# Patient Record
Sex: MALE | Race: BLACK OR AFRICAN AMERICAN | NOT HISPANIC OR LATINO
[De-identification: names, ages, dates, MRNs, and addresses within clinical notes are randomized per-mention and may not be internally consistent; named-entity substitution may affect disease eponyms.]

---

## 2021-04-19 PROBLEM — Z00.00 ENCOUNTER FOR PREVENTIVE HEALTH EXAMINATION: Status: ACTIVE | Noted: 2021-04-19

## 2021-05-06 ENCOUNTER — APPOINTMENT (OUTPATIENT)
Dept: UROLOGY | Facility: CLINIC | Age: 86
End: 2021-05-06

## 2021-05-28 ENCOUNTER — APPOINTMENT (OUTPATIENT)
Dept: UROLOGY | Facility: CLINIC | Age: 86
End: 2021-05-28
Payer: COMMERCIAL

## 2021-05-28 VITALS
HEIGHT: 72 IN | TEMPERATURE: 98 F | SYSTOLIC BLOOD PRESSURE: 128 MMHG | DIASTOLIC BLOOD PRESSURE: 78 MMHG | HEART RATE: 93 BPM | WEIGHT: 168 LBS | BODY MASS INDEX: 22.75 KG/M2

## 2021-05-28 PROCEDURE — 99204 OFFICE O/P NEW MOD 45 MIN: CPT

## 2021-05-28 NOTE — ASSESSMENT
[FreeTextEntry1] : 86 year old male former smoker with gross hematuria \par Pt's wife reported 1 episode of gross hematuria (no clots) ~3 weeks ago \par +urinary frequency, dysuria, and intermittent dull R back pain \par PVR today to r/o retention: 100ml \par UA/UCx/Urine cytology \par CT urogram \par F/U for cystoscopy

## 2021-05-28 NOTE — HISTORY OF PRESENT ILLNESS
[FreeTextEntry1] : 86 year old male referred by Davina EUCEDA (family medicine)\par + urinary frequency, intermittent dysuria and Intermittent dull upper right back pain (5/10) for over a few months\par Pt's wife reports 1 episode of gross hematuria approximately 3 weeks ago (burgundy in color, no clots) \par Denies incontinence, fever/chills, N/V \par Good FOS\par Currently on Tamsulosin and Terazosin 10mg \par \par 4/19/21 UA - trace amount of blood and leukocytes. Treated with Macrobid x 7 days. \par Patient reports mild improvement in his urinary symptoms after abx. \par \par MedicalHx: HLD, Asthma?\par FamilyHx: No kidney, prostate, bladder cancer that he is aware of \par SocialHx: Former smoker (quit prior to 1994, <1pack/day). Occasional ETOH use

## 2021-06-01 LAB
APPEARANCE: ABNORMAL
BACTERIA UR CULT: NORMAL
BACTERIA: NEGATIVE
BILIRUBIN URINE: NEGATIVE
BLOOD URINE: ABNORMAL
CALCIUM OXALATE CRYSTALS: ABNORMAL
COLOR: YELLOW
GLUCOSE QUALITATIVE U: NORMAL
HYALINE CASTS: 0 /LPF
KETONES URINE: NEGATIVE
LEUKOCYTE ESTERASE URINE: ABNORMAL
MICROSCOPIC-UA: NORMAL
NITRITE URINE: NEGATIVE
PH URINE: 6
PROTEIN URINE: ABNORMAL
RED BLOOD CELLS URINE: 39 /HPF
SPECIFIC GRAVITY URINE: 1.02
SQUAMOUS EPITHELIAL CELLS: 1 /HPF
UROBILINOGEN URINE: NORMAL
WHITE BLOOD CELLS URINE: 28 /HPF

## 2021-06-10 ENCOUNTER — APPOINTMENT (OUTPATIENT)
Dept: UROLOGY | Facility: CLINIC | Age: 86
End: 2021-06-10
Payer: COMMERCIAL

## 2021-06-10 VITALS — HEART RATE: 94 BPM | DIASTOLIC BLOOD PRESSURE: 63 MMHG | SYSTOLIC BLOOD PRESSURE: 152 MMHG | TEMPERATURE: 98.2 F

## 2021-06-10 DIAGNOSIS — R31.0 GROSS HEMATURIA: ICD-10-CM

## 2021-06-10 LAB — URINE CYTOLOGY: NORMAL

## 2021-06-10 PROCEDURE — 52000 CYSTOURETHROSCOPY: CPT

## 2021-06-10 PROCEDURE — 99214 OFFICE O/P EST MOD 30 MIN: CPT | Mod: 25,57

## 2021-06-10 NOTE — ASSESSMENT
[FreeTextEntry1] : bladder cnacer\par await ct urogram\par role TURBT reviewed at length\par risks - infection hematuira dysruia bladder perf need for quintanilla reviewed\par he and his wife understand\par will schedule

## 2021-06-10 NOTE — HISTORY OF PRESENT ILLNESS
[FreeTextEntry1] : diffusely erytematous bladder and tumor noted throughout\par poor visualziation\par +urine cytology\par needs CT scan\par

## 2021-06-14 LAB
ANION GAP SERPL CALC-SCNC: 11 MMOL/L
APPEARANCE: ABNORMAL
APTT BLD: 30.1 SEC
BACTERIA UR CULT: NORMAL
BACTERIA: NEGATIVE
BASOPHILS # BLD AUTO: 0.03 K/UL
BASOPHILS NFR BLD AUTO: 0.4 %
BILIRUBIN URINE: ABNORMAL
BLOOD URINE: ABNORMAL
BUN SERPL-MCNC: 17 MG/DL
CALCIUM SERPL-MCNC: 9.3 MG/DL
CHLORIDE SERPL-SCNC: 106 MMOL/L
CO2 SERPL-SCNC: 26 MMOL/L
COLOR: ABNORMAL
CREAT SERPL-MCNC: 1.06 MG/DL
EOSINOPHIL # BLD AUTO: 0.39 K/UL
EOSINOPHIL NFR BLD AUTO: 5.1 %
GLUCOSE QUALITATIVE U: NEGATIVE
GLUCOSE SERPL-MCNC: 112 MG/DL
HCT VFR BLD CALC: 43.5 %
HGB BLD-MCNC: 14.4 G/DL
HYALINE CASTS: 2 /LPF
IMM GRANULOCYTES NFR BLD AUTO: 0.4 %
INR PPP: 1.07 RATIO
KETONES URINE: NEGATIVE
LEUKOCYTE ESTERASE URINE: ABNORMAL
LYMPHOCYTES # BLD AUTO: 1.76 K/UL
LYMPHOCYTES NFR BLD AUTO: 23.1 %
MAN DIFF?: NORMAL
MCHC RBC-ENTMCNC: 30.8 PG
MCHC RBC-ENTMCNC: 33.1 GM/DL
MCV RBC AUTO: 93.1 FL
MICROSCOPIC-UA: NORMAL
MONOCYTES # BLD AUTO: 0.57 K/UL
MONOCYTES NFR BLD AUTO: 7.5 %
NEUTROPHILS # BLD AUTO: 4.84 K/UL
NEUTROPHILS NFR BLD AUTO: 63.5 %
NITRITE URINE: POSITIVE
PH URINE: 6.5
PLATELET # BLD AUTO: 201 K/UL
POTASSIUM SERPL-SCNC: 4 MMOL/L
PROTEIN URINE: ABNORMAL
PT BLD: 12.7 SEC
RBC # BLD: 4.67 M/UL
RBC # FLD: 13.4 %
RED BLOOD CELLS URINE: >720 /HPF
SARS-COV-2 N GENE NPH QL NAA+PROBE: NOT DETECTED
SODIUM SERPL-SCNC: 142 MMOL/L
SPECIFIC GRAVITY URINE: 1.02
SQUAMOUS EPITHELIAL CELLS: 11 /HPF
UROBILINOGEN URINE: NORMAL
WBC # FLD AUTO: 7.62 K/UL
WHITE BLOOD CELLS URINE: 10 /HPF

## 2021-07-11 ENCOUNTER — TRANSCRIPTION ENCOUNTER (OUTPATIENT)
Age: 86
End: 2021-07-11

## 2021-07-12 ENCOUNTER — RESULT REVIEW (OUTPATIENT)
Age: 86
End: 2021-07-12

## 2021-07-12 ENCOUNTER — APPOINTMENT (OUTPATIENT)
Dept: UROLOGY | Facility: AMBULATORY SURGERY CENTER | Age: 86
End: 2021-07-12

## 2021-07-12 ENCOUNTER — OUTPATIENT (OUTPATIENT)
Dept: OUTPATIENT SERVICES | Facility: HOSPITAL | Age: 86
LOS: 1 days | Discharge: HOME | End: 2021-07-12
Payer: COMMERCIAL

## 2021-07-12 DIAGNOSIS — C67.9 MALIGNANT NEOPLASM OF BLADDER, UNSPECIFIED: ICD-10-CM

## 2021-07-12 PROCEDURE — 52240 CYSTOSCOPY AND TREATMENT: CPT

## 2021-07-12 PROCEDURE — 88305 TISSUE EXAM BY PATHOLOGIST: CPT | Mod: 26

## 2021-07-12 RX ORDER — PHENAZOPYRIDINE 200 MG/1
200 TABLET, FILM COATED ORAL 3 TIMES DAILY
Qty: 9 | Refills: 0 | Status: ACTIVE | COMMUNITY
Start: 2021-07-12 | End: 1900-01-01

## 2021-07-14 ENCOUNTER — APPOINTMENT (OUTPATIENT)
Dept: UROLOGY | Facility: CLINIC | Age: 86
End: 2021-07-14
Payer: COMMERCIAL

## 2021-07-14 VITALS — HEART RATE: 105 BPM | DIASTOLIC BLOOD PRESSURE: 69 MMHG | SYSTOLIC BLOOD PRESSURE: 151 MMHG | TEMPERATURE: 97.6 F

## 2021-07-14 DIAGNOSIS — N40.1 BENIGN PROSTATIC HYPERPLASIA WITH LOWER URINARY TRACT SYMPMS: ICD-10-CM

## 2021-07-14 LAB — SURGICAL PATHOLOGY STUDY: SIGNIFICANT CHANGE UP

## 2021-07-14 PROCEDURE — 99024 POSTOP FOLLOW-UP VISIT: CPT

## 2021-07-14 NOTE — PHYSICAL EXAM

## 2021-07-14 NOTE — ASSESSMENT
[FreeTextEntry1] : BPH with LUTS\par s/p transurethral resection of prostates 7/12/2021\par POD #3 quintanilla removal\par \par Fr quintanilla, dark straw colored urine\par Catheter removed without complications, patient tolerated well.\par Patient had bowel movement while trying to urinate. \par Instilled 200 ml sterile water\par TOV - voided  50 ml, straw light pink urine\par PVR  ml, r/o urinary retention\par Patient feels urge to defecate and feels full. \par \par Reviewed post op instructions, advised patient  to call office and got o ER if experiencing severe pain, unable to urinate, gross hematuria, fever and chills. \par Patient and wife verbalized understanding of instructions\par \par Follow up in one week.

## 2021-07-22 ENCOUNTER — APPOINTMENT (OUTPATIENT)
Dept: UROLOGY | Facility: CLINIC | Age: 86
End: 2021-07-22
Payer: COMMERCIAL

## 2021-07-22 VITALS — DIASTOLIC BLOOD PRESSURE: 77 MMHG | HEART RATE: 80 BPM | SYSTOLIC BLOOD PRESSURE: 131 MMHG | TEMPERATURE: 97.7 F

## 2021-07-22 PROBLEM — C67.9 BLADDER CANCER: Status: ACTIVE | Noted: 2021-06-10

## 2021-07-22 PROCEDURE — 99214 OFFICE O/P EST MOD 30 MIN: CPT

## 2021-07-22 RX ORDER — ALFUZOSIN HYDROCHLORIDE 10 MG/1
10 TABLET, EXTENDED RELEASE ORAL DAILY
Qty: 30 | Refills: 11 | Status: ACTIVE | COMMUNITY
Start: 2021-07-22 | End: 1900-01-01

## 2021-07-22 NOTE — ASSESSMENT
[FreeTextEntry1] : urothelial CIS\par role repeat biopsy reviewed\par role BCG reviewed\par will schedule for OR\par \par uirnary retention\par for voiding trial overnight\par due to start alfuzosin\par f/u tomorrow for PVR\par

## 2021-07-22 NOTE — HISTORY OF PRESENT ILLNESS
[FreeTextEntry1] : s/p TURBT +CIS\par post op retention seen at ER now with quintanilla in place\par here to discuss

## 2021-07-23 ENCOUNTER — APPOINTMENT (OUTPATIENT)
Dept: UROLOGY | Facility: CLINIC | Age: 86
End: 2021-07-23

## 2021-07-27 ENCOUNTER — APPOINTMENT (OUTPATIENT)
Dept: UROLOGY | Facility: CLINIC | Age: 86
End: 2021-07-27
Payer: COMMERCIAL

## 2021-07-27 VITALS — HEART RATE: 99 BPM | DIASTOLIC BLOOD PRESSURE: 72 MMHG | TEMPERATURE: 97.6 F | SYSTOLIC BLOOD PRESSURE: 145 MMHG

## 2021-07-27 PROCEDURE — 51798 US URINE CAPACITY MEASURE: CPT

## 2021-07-27 PROCEDURE — 99212 OFFICE O/P EST SF 10 MIN: CPT

## 2021-07-27 NOTE — HISTORY OF PRESENT ILLNESS
[FreeTextEntry1] : returns for eval\par voiding comfortably\par pvr to r/o retetention 1 cc\par no new compalints\par scheudled for TURBT

## 2021-07-28 LAB
ANION GAP SERPL CALC-SCNC: 14 MMOL/L
APPEARANCE: CLEAR
APTT BLD: 31.6 SEC
BACTERIA: ABNORMAL
BASOPHILS # BLD AUTO: 0.07 K/UL
BASOPHILS NFR BLD AUTO: 0.8 %
BILIRUBIN URINE: NEGATIVE
BLOOD URINE: ABNORMAL
BUN SERPL-MCNC: 14 MG/DL
CALCIUM SERPL-MCNC: 9.2 MG/DL
CHLORIDE SERPL-SCNC: 104 MMOL/L
CO2 SERPL-SCNC: 26 MMOL/L
COLOR: ABNORMAL
CREAT SERPL-MCNC: 1.11 MG/DL
EOSINOPHIL # BLD AUTO: 0.21 K/UL
EOSINOPHIL NFR BLD AUTO: 2.3 %
GLUCOSE QUALITATIVE U: NEGATIVE
GLUCOSE SERPL-MCNC: 80 MG/DL
HCT VFR BLD CALC: 44.5 %
HGB BLD-MCNC: 14.1 G/DL
HYALINE CASTS: 0 /LPF
IMM GRANULOCYTES NFR BLD AUTO: 0.3 %
INR PPP: 1.05 RATIO
KETONES URINE: NEGATIVE
LEUKOCYTE ESTERASE URINE: ABNORMAL
LYMPHOCYTES # BLD AUTO: 2.29 K/UL
LYMPHOCYTES NFR BLD AUTO: 25.3 %
MAN DIFF?: NORMAL
MCHC RBC-ENTMCNC: 30.1 PG
MCHC RBC-ENTMCNC: 31.7 GM/DL
MCV RBC AUTO: 95.1 FL
MICROSCOPIC-UA: NORMAL
MONOCYTES # BLD AUTO: 0.65 K/UL
MONOCYTES NFR BLD AUTO: 7.2 %
NEUTROPHILS # BLD AUTO: 5.79 K/UL
NEUTROPHILS NFR BLD AUTO: 64.1 %
NITRITE URINE: POSITIVE
PH URINE: 6.5
PLATELET # BLD AUTO: 260 K/UL
POTASSIUM SERPL-SCNC: 4.2 MMOL/L
PROTEIN URINE: ABNORMAL
PT BLD: 12.4 SEC
RBC # BLD: 4.68 M/UL
RBC # FLD: 13.5 %
RED BLOOD CELLS URINE: 70 /HPF
SODIUM SERPL-SCNC: 143 MMOL/L
SPECIFIC GRAVITY URINE: 1.02
SQUAMOUS EPITHELIAL CELLS: 2 /HPF
UROBILINOGEN URINE: NORMAL
WBC # FLD AUTO: 9.04 K/UL
WHITE BLOOD CELLS URINE: 10 /HPF

## 2021-08-02 LAB — BACTERIA UR CULT: NORMAL

## 2021-08-03 ENCOUNTER — APPOINTMENT (OUTPATIENT)
Dept: INTERNAL MEDICINE | Facility: CLINIC | Age: 86
End: 2021-08-03

## 2021-08-05 ENCOUNTER — APPOINTMENT (OUTPATIENT)
Dept: INTERNAL MEDICINE | Facility: CLINIC | Age: 86
End: 2021-08-05

## 2021-08-06 ENCOUNTER — INPATIENT (INPATIENT)
Facility: HOSPITAL | Age: 86
LOS: 6 days | Discharge: HOME | DRG: 872 | End: 2021-08-13
Attending: SURGERY | Admitting: SURGERY
Payer: MEDICARE

## 2021-08-06 VITALS
DIASTOLIC BLOOD PRESSURE: 76 MMHG | HEART RATE: 78 BPM | OXYGEN SATURATION: 95 % | SYSTOLIC BLOOD PRESSURE: 150 MMHG | RESPIRATION RATE: 18 BRPM

## 2021-08-06 LAB
ALBUMIN SERPL ELPH-MCNC: 3.8 G/DL — SIGNIFICANT CHANGE UP (ref 3.3–5)
ALP SERPL-CCNC: 68 U/L — SIGNIFICANT CHANGE UP (ref 40–120)
ALT FLD-CCNC: 8 U/L — LOW (ref 10–45)
ANION GAP SERPL CALC-SCNC: 13 MMOL/L — SIGNIFICANT CHANGE UP (ref 5–17)
APPEARANCE UR: CLEAR — SIGNIFICANT CHANGE UP
AST SERPL-CCNC: 12 U/L — SIGNIFICANT CHANGE UP (ref 10–40)
BACTERIA # UR AUTO: PRESENT /HPF
BILIRUB SERPL-MCNC: 0.8 MG/DL — SIGNIFICANT CHANGE UP (ref 0.2–1.2)
BILIRUB UR-MCNC: NEGATIVE — SIGNIFICANT CHANGE UP
BUN SERPL-MCNC: 19 MG/DL — SIGNIFICANT CHANGE UP (ref 7–23)
CALCIUM SERPL-MCNC: 9.2 MG/DL — SIGNIFICANT CHANGE UP (ref 8.4–10.5)
CHLORIDE SERPL-SCNC: 104 MMOL/L — SIGNIFICANT CHANGE UP (ref 96–108)
CO2 SERPL-SCNC: 26 MMOL/L — SIGNIFICANT CHANGE UP (ref 22–31)
COLOR SPEC: YELLOW — SIGNIFICANT CHANGE UP
COMMENT - URINE: SIGNIFICANT CHANGE UP
CREAT SERPL-MCNC: 1.09 MG/DL — SIGNIFICANT CHANGE UP (ref 0.5–1.3)
DIFF PNL FLD: ABNORMAL
EPI CELLS # UR: SIGNIFICANT CHANGE UP /HPF (ref 0–5)
GLUCOSE SERPL-MCNC: 127 MG/DL — HIGH (ref 70–99)
GLUCOSE UR QL: NEGATIVE — SIGNIFICANT CHANGE UP
HCT VFR BLD CALC: 40.5 % — SIGNIFICANT CHANGE UP (ref 39–50)
HGB BLD-MCNC: 13.3 G/DL — SIGNIFICANT CHANGE UP (ref 13–17)
KETONES UR-MCNC: NEGATIVE — SIGNIFICANT CHANGE UP
LACTATE SERPL-SCNC: 1.1 MMOL/L — SIGNIFICANT CHANGE UP (ref 0.5–2)
LEUKOCYTE ESTERASE UR-ACNC: ABNORMAL
MCHC RBC-ENTMCNC: 30 PG — SIGNIFICANT CHANGE UP (ref 27–34)
MCHC RBC-ENTMCNC: 32.8 GM/DL — SIGNIFICANT CHANGE UP (ref 32–36)
MCV RBC AUTO: 91.2 FL — SIGNIFICANT CHANGE UP (ref 80–100)
NITRITE UR-MCNC: NEGATIVE — SIGNIFICANT CHANGE UP
NRBC # BLD: 0 /100 WBCS — SIGNIFICANT CHANGE UP (ref 0–0)
PH UR: 6 — SIGNIFICANT CHANGE UP (ref 5–8)
PLATELET # BLD AUTO: 221 K/UL — SIGNIFICANT CHANGE UP (ref 150–400)
POTASSIUM SERPL-MCNC: 4.2 MMOL/L — SIGNIFICANT CHANGE UP (ref 3.5–5.3)
POTASSIUM SERPL-SCNC: 4.2 MMOL/L — SIGNIFICANT CHANGE UP (ref 3.5–5.3)
PROT SERPL-MCNC: 6.9 G/DL — SIGNIFICANT CHANGE UP (ref 6–8.3)
PROT UR-MCNC: 30 MG/DL
RAPID RVP RESULT: SIGNIFICANT CHANGE UP
RBC # BLD: 4.44 M/UL — SIGNIFICANT CHANGE UP (ref 4.2–5.8)
RBC # FLD: 13 % — SIGNIFICANT CHANGE UP (ref 10.3–14.5)
RBC CASTS # UR COMP ASSIST: ABNORMAL /HPF
SARS-COV-2 RNA SPEC QL NAA+PROBE: NEGATIVE — SIGNIFICANT CHANGE UP
SARS-COV-2 RNA SPEC QL NAA+PROBE: SIGNIFICANT CHANGE UP
SODIUM SERPL-SCNC: 143 MMOL/L — SIGNIFICANT CHANGE UP (ref 135–145)
SP GR SPEC: 1.01 — SIGNIFICANT CHANGE UP (ref 1–1.03)
UROBILINOGEN FLD QL: 0.2 E.U./DL — SIGNIFICANT CHANGE UP
WBC # BLD: 12.22 K/UL — HIGH (ref 3.8–10.5)
WBC # FLD AUTO: 12.22 K/UL — HIGH (ref 3.8–10.5)
WBC UR QL: > 10 /HPF

## 2021-08-06 PROCEDURE — G1004: CPT

## 2021-08-06 PROCEDURE — 99285 EMERGENCY DEPT VISIT HI MDM: CPT

## 2021-08-06 PROCEDURE — 74177 CT ABD & PELVIS W/CONTRAST: CPT | Mod: 26,ME

## 2021-08-06 RX ORDER — ONDANSETRON 8 MG/1
4 TABLET, FILM COATED ORAL EVERY 6 HOURS
Refills: 0 | Status: DISCONTINUED | OUTPATIENT
Start: 2021-08-06 | End: 2021-08-13

## 2021-08-06 RX ORDER — ACETAMINOPHEN 500 MG
650 TABLET ORAL ONCE
Refills: 0 | Status: COMPLETED | OUTPATIENT
Start: 2021-08-06 | End: 2021-08-06

## 2021-08-06 RX ORDER — SODIUM CHLORIDE 9 MG/ML
1000 INJECTION, SOLUTION INTRAVENOUS
Refills: 0 | Status: DISCONTINUED | OUTPATIENT
Start: 2021-08-06 | End: 2021-08-09

## 2021-08-06 RX ORDER — PIPERACILLIN AND TAZOBACTAM 4; .5 G/20ML; G/20ML
3.38 INJECTION, POWDER, LYOPHILIZED, FOR SOLUTION INTRAVENOUS EVERY 6 HOURS
Refills: 0 | Status: COMPLETED | OUTPATIENT
Start: 2021-08-06 | End: 2021-08-13

## 2021-08-06 RX ORDER — VANCOMYCIN HCL 1 G
1000 VIAL (EA) INTRAVENOUS ONCE
Refills: 0 | Status: COMPLETED | OUTPATIENT
Start: 2021-08-06 | End: 2021-08-06

## 2021-08-06 RX ORDER — SODIUM CHLORIDE 9 MG/ML
2250 INJECTION INTRAMUSCULAR; INTRAVENOUS; SUBCUTANEOUS ONCE
Refills: 0 | Status: COMPLETED | OUTPATIENT
Start: 2021-08-06 | End: 2021-08-06

## 2021-08-06 RX ORDER — PIPERACILLIN AND TAZOBACTAM 4; .5 G/20ML; G/20ML
3.38 INJECTION, POWDER, LYOPHILIZED, FOR SOLUTION INTRAVENOUS ONCE
Refills: 0 | Status: COMPLETED | OUTPATIENT
Start: 2021-08-06 | End: 2021-08-06

## 2021-08-06 RX ORDER — ACETAMINOPHEN 500 MG
650 TABLET ORAL EVERY 6 HOURS
Refills: 0 | Status: DISCONTINUED | OUTPATIENT
Start: 2021-08-06 | End: 2021-08-13

## 2021-08-06 RX ADMIN — Medication 250 MILLIGRAM(S): at 16:02

## 2021-08-06 RX ADMIN — PIPERACILLIN AND TAZOBACTAM 200 GRAM(S): 4; .5 INJECTION, POWDER, LYOPHILIZED, FOR SOLUTION INTRAVENOUS at 14:33

## 2021-08-06 RX ADMIN — Medication 650 MILLIGRAM(S): at 14:34

## 2021-08-06 RX ADMIN — SODIUM CHLORIDE 2250 MILLILITER(S): 9 INJECTION INTRAMUSCULAR; INTRAVENOUS; SUBCUTANEOUS at 14:34

## 2021-08-06 RX ADMIN — PIPERACILLIN AND TAZOBACTAM 3.38 GRAM(S): 4; .5 INJECTION, POWDER, LYOPHILIZED, FOR SOLUTION INTRAVENOUS at 15:28

## 2021-08-06 NOTE — ED PROVIDER NOTE - CLINICAL SUMMARY MEDICAL DECISION MAKING FREE TEXT BOX
Patient with recent bladder resection who presents to the ER Patient with recent bladder resection who presents to the ER with lower abdominal pain, tachycardia and fevers.     Given recent bladder cancer and resection, concerns for intra abdominal pathology or infection.  Patient underwent lab work and CT scan and was started on broad spectrum antibiotics via IV    On CT was found to have  Sigmoid diverticulitis with 3.4 cm air and fluid containing intraluminal abscess.     Surgery was consulted.   Will admit for management.

## 2021-08-06 NOTE — H&P ADULT - HISTORY OF PRESENT ILLNESS
86-year-old male, poor historian, with history of HTN and recent diagnosis of Urothelial carcinoma s/p TURBT 2 weeks ago who presents with to the ED generalized weakness, increased urinary frequency and dysuria and found to have sigmoid diverticulitis associated LLQ abdominal pain and fever; endorse poor po intake. No chills, N/V/D/C; Last BM this morning, soft, non bloody; no melena, hematochezia; No SOB or chest pain. Never had symptoms like this before. Last colposcopy 20 years ago; reports had diverticula, otherwise negative; no EGD. No personal or family history of carcinoma.     PMH: HTN Urotheilial carcinoma s/p TURBT   PSH: TURBT   Meds: "takes HTN med" thinks its lisinopril, unable to recall  allergies: hay fever

## 2021-08-06 NOTE — H&P ADULT - NSHPLABSRESULTS_GEN_ALL_CORE
LABS:                        13.3   12.22 )-----------( 221      ( 06 Aug 2021 14:08 )             40.5     -    143  |  104  |  19  ----------------------------<  127<H>  4.2   |  26  |  1.09    Ca    9.2      06 Aug 2021 14:08    TPro  6.9  /  Alb  3.8  /  TBili  0.8  /  DBili  x   /  AST  12  /  ALT  8<L>  /  AlkPhos  68  08-      Urinalysis Basic - ( 06 Aug 2021 20:12 )    Color: Yellow / Appearance: Clear / S.010 / pH: x  Gluc: x / Ketone: NEGATIVE  / Bili: Negative / Urobili: 0.2 E.U./dL   Blood: x / Protein: 30 mg/dL / Nitrite: NEGATIVE   Leuk Esterase: Small / RBC: Many /HPF / WBC > 10 /HPF   Sq Epi: x / Non Sq Epi: 0-5 /HPF / Bacteria: Present /HPF        RADIOLOGY & ADDITIONAL STUDIES:  CT Abdomen and Pelvis w/ IV Cont:   EXAM:  CT ABDOMEN AND PELVIS IC                          PROCEDURE DATE:  2021          INTERPRETATION:  CT SCAN OF ABDOMEN AND PELVIS    History: Abdominal pain and fever. Recent bladder cancer resection.    Technique: CT scan of abdomen andpelvis was performed from lung bases through symphysis pubis. Intravenous contrast was utilized. Axial, sagittal and coronal reformatted images were reviewed.    Comparison: None.    Findings:    Lower chest: Normal.    Liver:  Normal.    Gallbladder: No radiopaque stones gallbladder.    Spleen:  Subcentimeter hypodensity in the spleen, too small to characterize.    Pancreas:  Normal.    Adrenal glands:  Normal.    Kidneys: 3.4 cm cyst lower pole left kidney. Multiple subcentimeter hypodensities in the right kidney, too small characterize..    Adenopathy:  Mildly prominent right common iliac nodes, measuring 0.7 cm. Few small pericolic nodes (3:69).    Ascites: None.    Gastrointestinal tract: Study is limited without oral contrast. Within this limitation there is colonic diverticulosis. There is a short segment of proximal sigmoid colon demonstrating asymmetrical wall thickening and perisigmoidal fat stranding and peritoneal thickening. There is an associated 3.4 x 2 x 3.1 cm rim-enhancing air containing hypodensity (series 4, image 32). There is no free air or evidence of obstruction. Normal appendix.    Vessels: Moderate calcified plaque aorta.    Pelvic organs: Asymmetric right bladder wall thickening and perivesicular fat stranding with 2.3 cm posterior bladder diverticulum. Coarse calcifications within a nonenlarged prostate.    Soft tissues: Normal.    Bones: Moderate degenerative change of the spine.    Impression:  1.  Sigmoid diverticulitis with 3.4 cm associated abscess. There is no free air or evidence of obstruction.  2.  Asymmetric bladder wall thickening with perivesicular fat stranding, which may reflect patient's known bladder cancer. Few mildly prominent right pelvic lymph nodes, metastasis cannot be excluded.        --- End of Report ---          Thank you for the opportunity to participate in the care of this patient.    DEBRA KAUR MD; Resident Radiologist  This document has been electronically signed.  TAHIRA MICHELLE MD; Attending Radiologist  This document has been electronically signed. Aug  6 2021  5:39PM (21 @ 16:24)

## 2021-08-06 NOTE — CHART NOTE - NSCHARTNOTEFT_GEN_A_CORE
Chief Surgery Resident's Note:    c/s for acute diverticulitis w/ abscess     86M w/ hx of urothelial carcinoma s/p TURBT 2 weeks ago with Dr. Lopez presenting with symptoms of burning on urination. CT A/P found incidental sigmoid diverticulitis with ~3.4 cm paracolonic abscess. No pneumoperitoneum or obstruction. Vitals stable. LLQ tenderness on exam, no peritonitis. WBC 12.2.     A/P: admit to surgery, telemetry level of care. Serial abdominal exams,  bowel rest, IV abx. No indication for IR drainage at this time. Discussed with surgical attending on call.

## 2021-08-06 NOTE — ED ADULT NURSE NOTE - OBJECTIVE STATEMENT
pt is 86y male, here for generalized weakness x a few days, pt denies any pain, fevers, n,v,d or urinary sx, per wife pt had a procedure for bladder biopsy 2 week ago, pt is a&Ox3, ambulatory with steady gait, abd soft, non-distended, non-tender, NAD present

## 2021-08-06 NOTE — H&P ADULT - ASSESSMENT
86-year-old male, poor historian, with history of HTN and recent diagnosis of Urothelial carcinoma s/p TURBT 2 weeks ago who presents increased urinary frequency and dysuria and found to have Sigmoid diverticulitison CT with ~3.4 cm paracolonic abscess;( Hinchey class 1b) No pneumoperitoneum or obstruction. Also has fever 100.4, LLQ tenderness on exam, WBC 12.2;     plan:   Admit to telemetry  NPO/IVF/OOB/IS  Continue Zosyn   Bowel rest  Serial abd exams  pain control  Team 4 surgery will follow   plan discussed with chief and attending on call

## 2021-08-06 NOTE — ED ADULT TRIAGE NOTE - CHIEF COMPLAINT QUOTE
85 y/o male c/o weakness for two weeks s/p discharge from the hospital two weeks ago. Pt noted febrile 100.4 oral temp.

## 2021-08-06 NOTE — ED PROVIDER NOTE - OBJECTIVE STATEMENT
86 year old male patient with history of Bladder Cancer who presents to the ER with his wife with generalized weakness and increased urinary frequency and dysuria. Patient underwent bladder cancer resection two weeks ago under Dr. Lopez, Urology and was sent home on antibiotics for 10 days. Wife states patient has been weak with continued hematuria and decreased PO intake.     Denies chest pain, nausea, vomiting, shortness of breath or headache.   No recent sick contacts or travel  Patient is vaccinated with Pfizer.

## 2021-08-06 NOTE — H&P ADULT - NSHPPHYSICALEXAM_GEN_ALL_CORE
Spoke to Celia and nataly scheduled for pt tbs   GENERAL: NAD  HEENT: NCAT, MMM, Normal conjunctiva, PERRL  RESP: Nonlabored breathing, No respiratory distress  CARD: not tachycardic, Normal peripheral perfusion  GI: soft, moderately distended; LLQ focal Tenderness with rebound; no rebound tenderness   EXTREM: No edema, No gross deformity of extremities  SKIN: No rashes, no lesions, seborrheic keratosis over abdomen   NEURO: AAOx3, No focal motor or sensory deficits  PSYCH: Affect and characteristics of appearance, verbalizations, and behaviors are appropriate

## 2021-08-07 LAB
ANION GAP SERPL CALC-SCNC: 10 MMOL/L — SIGNIFICANT CHANGE UP (ref 5–17)
BUN SERPL-MCNC: 15 MG/DL — SIGNIFICANT CHANGE UP (ref 7–23)
CALCIUM SERPL-MCNC: 8.5 MG/DL — SIGNIFICANT CHANGE UP (ref 8.4–10.5)
CHLORIDE SERPL-SCNC: 104 MMOL/L — SIGNIFICANT CHANGE UP (ref 96–108)
CO2 SERPL-SCNC: 24 MMOL/L — SIGNIFICANT CHANGE UP (ref 22–31)
COVID-19 SPIKE DOMAIN AB INTERP: POSITIVE
COVID-19 SPIKE DOMAIN ANTIBODY RESULT: >250 U/ML — HIGH
CREAT SERPL-MCNC: 0.99 MG/DL — SIGNIFICANT CHANGE UP (ref 0.5–1.3)
GLUCOSE SERPL-MCNC: 125 MG/DL — HIGH (ref 70–99)
HCT VFR BLD CALC: 36.2 % — LOW (ref 39–50)
HGB BLD-MCNC: 11.9 G/DL — LOW (ref 13–17)
MAGNESIUM SERPL-MCNC: 2.2 MG/DL — SIGNIFICANT CHANGE UP (ref 1.6–2.6)
MCHC RBC-ENTMCNC: 30.3 PG — SIGNIFICANT CHANGE UP (ref 27–34)
MCHC RBC-ENTMCNC: 32.9 GM/DL — SIGNIFICANT CHANGE UP (ref 32–36)
MCV RBC AUTO: 92.1 FL — SIGNIFICANT CHANGE UP (ref 80–100)
NRBC # BLD: 0 /100 WBCS — SIGNIFICANT CHANGE UP (ref 0–0)
PHOSPHATE SERPL-MCNC: 2.9 MG/DL — SIGNIFICANT CHANGE UP (ref 2.5–4.5)
PLATELET # BLD AUTO: 212 K/UL — SIGNIFICANT CHANGE UP (ref 150–400)
POTASSIUM SERPL-MCNC: 3.6 MMOL/L — SIGNIFICANT CHANGE UP (ref 3.5–5.3)
POTASSIUM SERPL-SCNC: 3.6 MMOL/L — SIGNIFICANT CHANGE UP (ref 3.5–5.3)
RBC # BLD: 3.93 M/UL — LOW (ref 4.2–5.8)
RBC # FLD: 12.9 % — SIGNIFICANT CHANGE UP (ref 10.3–14.5)
SARS-COV-2 IGG+IGM SERPL QL IA: >250 U/ML — HIGH
SARS-COV-2 IGG+IGM SERPL QL IA: POSITIVE
SODIUM SERPL-SCNC: 138 MMOL/L — SIGNIFICANT CHANGE UP (ref 135–145)
WBC # BLD: 8.2 K/UL — SIGNIFICANT CHANGE UP (ref 3.8–10.5)
WBC # FLD AUTO: 8.2 K/UL — SIGNIFICANT CHANGE UP (ref 3.8–10.5)

## 2021-08-07 RX ORDER — LABETALOL HCL 100 MG
10 TABLET ORAL ONCE
Refills: 0 | Status: COMPLETED | OUTPATIENT
Start: 2021-08-07 | End: 2021-08-07

## 2021-08-07 RX ORDER — ENOXAPARIN SODIUM 100 MG/ML
40 INJECTION SUBCUTANEOUS AT BEDTIME
Refills: 0 | Status: DISCONTINUED | OUTPATIENT
Start: 2021-08-07 | End: 2021-08-13

## 2021-08-07 RX ORDER — POTASSIUM CHLORIDE 20 MEQ
10 PACKET (EA) ORAL
Refills: 0 | Status: DISCONTINUED | OUTPATIENT
Start: 2021-08-07 | End: 2021-08-07

## 2021-08-07 RX ORDER — POTASSIUM CHLORIDE 20 MEQ
10 PACKET (EA) ORAL
Refills: 0 | Status: COMPLETED | OUTPATIENT
Start: 2021-08-07 | End: 2021-08-08

## 2021-08-07 RX ADMIN — SODIUM CHLORIDE 110 MILLILITER(S): 9 INJECTION, SOLUTION INTRAVENOUS at 00:19

## 2021-08-07 RX ADMIN — Medication 100 MILLIEQUIVALENT(S): at 21:49

## 2021-08-07 RX ADMIN — PIPERACILLIN AND TAZOBACTAM 200 GRAM(S): 4; .5 INJECTION, POWDER, LYOPHILIZED, FOR SOLUTION INTRAVENOUS at 06:02

## 2021-08-07 RX ADMIN — PIPERACILLIN AND TAZOBACTAM 200 GRAM(S): 4; .5 INJECTION, POWDER, LYOPHILIZED, FOR SOLUTION INTRAVENOUS at 18:29

## 2021-08-07 RX ADMIN — Medication 10 MILLIGRAM(S): at 10:55

## 2021-08-07 RX ADMIN — SODIUM CHLORIDE 110 MILLILITER(S): 9 INJECTION, SOLUTION INTRAVENOUS at 10:55

## 2021-08-07 RX ADMIN — PIPERACILLIN AND TAZOBACTAM 200 GRAM(S): 4; .5 INJECTION, POWDER, LYOPHILIZED, FOR SOLUTION INTRAVENOUS at 12:00

## 2021-08-07 RX ADMIN — PIPERACILLIN AND TAZOBACTAM 200 GRAM(S): 4; .5 INJECTION, POWDER, LYOPHILIZED, FOR SOLUTION INTRAVENOUS at 00:19

## 2021-08-07 RX ADMIN — Medication 100 MILLIEQUIVALENT(S): at 22:43

## 2021-08-07 NOTE — PROGRESS NOTE ADULT - ASSESSMENT
86-year-old male, PMH HTN and Urotheilial carcinoma s/p TURBT 2 weeks ago, presenting w generalized weakness and increased urinary frequency/dysuria and found to have sigmoid diverticulitis associated LLQ abdominal pain and fever. Urine cultures no growth yet, pending final results. Nursing also reports urinary incontinency w bladder scan ~220cc. Has had several episodes of watery stools. Stool C diff requested.  -NPO  -IVF  -IVAB (Zosyn)  -SQH/SCD  -f/u final urine cultures  -f/u stool labs  -f/u urology consult

## 2021-08-07 NOTE — CHART NOTE - NSCHARTNOTEFT_GEN_A_CORE
86-year-old male, PMH HTN and Urotheilial carcinoma s/p TURBT 2 weeks ago, presenting w generalized weakness and increased urinary frequency/dysuria and found to have sigmoid diverticulitis associated LLQ abdominal pain and fever. Nursing also report 3x watery diarrhea.  Patient did not permit physical exam and does not cooperate for evaluation. Does not appear in distress or acutely ill. AVSS. Urine cultures no growth yet, pending final results. Stool C diff requested. Will return for physical exam.

## 2021-08-07 NOTE — CONSULT NOTE ADULT - SUBJECTIVE AND OBJECTIVE BOX
HPI:  86-year-old male, poor historian, with history of HTN and recent diagnosis of Urothelial carcinoma s/p TURBT 2 weeks ago who presents with to the ED generalized weakness, increased urinary frequency and dysuria and found to have sigmoid diverticulitis associated LLQ abdominal pain and fever; endorse poor po intake. No chills, N/V/D/C; Last BM this morning, soft, non bloody; no melena, hematochezia; No SOB or chest pain. Never had symptoms like this before. Last colposcopy 20 years ago; reports had diverticula, otherwise negative; no EGD. No personal or family history of carcinoma.     Consult note  Patient reports his urinary symptoms have improved and he has no issues urinating. He has followed up in the office for biopsy review. He denies fever, chills, n/v, SOB or CP    PMH: HTN Urothelial carcinoma s/p TURBT   PSH: TURBT   Meds: "takes HTN med" thinks its lisinopril, unable to recall  allergies: hay fever (06 Aug 2021 23:21)      Vital Signs Last 24 Hrs  T(C): 36.9 (07 Aug 2021 17:30), Max: 37.2 (06 Aug 2021 21:40)  T(F): 98.4 (07 Aug 2021 17:30), Max: 98.9 (06 Aug 2021 21:40)  HR: 92 (07 Aug 2021 18:12) (76 - 102)  BP: 168/74 (07 Aug 2021 18:05) (139/79 - 177/77)  BP(mean): 107 (07 Aug 2021 18:05) (95 - 111)  RR: 19 (07 Aug 2021 18:05) (17 - 19)  SpO2: 96% (07 Aug 2021 18:12) (95% - 99%)  I&O's Summary    06 Aug 2021 07:01  -  07 Aug 2021 07:00  --------------------------------------------------------  IN: 970 mL / OUT: 0 mL / NET: 970 mL    07 Aug 2021 07:01  -  07 Aug 2021 18:21  --------------------------------------------------------  IN: 1210 mL / OUT: 0 mL / NET: 1210 mL    PE:  Gen: NAD  Abd: soft, nt/nd   : urinating         LABS:                        11.9   8.20  )-----------( 212      ( 07 Aug 2021 07:29 )             36.2     08-07    138  |  104  |  15  ----------------------------<  125<H>  3.6   |  24  |  0.99    Ca    8.5      07 Aug 2021 07:29  Phos  2.9     08-07  Mg     2.2     08-07    TPro  6.9  /  Alb  3.8  /  TBili  0.8  /  DBili  x   /  AST  12  /  ALT  8<L>  /  AlkPhos  68  08-06    Cultures  Culture Results:   No growth to date (08-06 @ 21:14)    A/P 86-year-old male, poor historian, with history of HTN and recent diagnosis of Urothelial carcinoma s/p TURBT 2 weeks ago who presents with to the ED generalized weakness, increased urinary frequency and dysuria and found to have sigmoid diverticulitis on CT     -He is on Zoysn and Vancomycin. Urinary symptoms have improved  -CT scan reviewed   -Ucx no growth to date, follow up   -Follow up outpatient as scheduled     Case reviewed with  resident

## 2021-08-07 NOTE — PROGRESS NOTE ADULT - SUBJECTIVE AND OBJECTIVE BOX
SUBJECTIVE:  Flatus: [ ] YES [ ] NO             Bowel Movement: [ ] YES [ ] NO  Pain (0-10):            Pain Control Adequate: [ ] YES [ ] NO  Nausea: [ ] YES [ ] NO            Vomiting: [ ] YES [ ] NO  Diarrhea: [ ] YES [ ] NO         Constipation: [ ] YES [ ] NO     Chest Pain: [ ] YES [ ] NO    SOB:  [ ] YES [ ] NO    MEDICATIONS  (STANDING):  lactated ringers. 1000 milliLiter(s) (110 mL/Hr) IV Continuous <Continuous>  piperacillin/tazobactam IVPB.. 3.375 Gram(s) IV Intermittent every 6 hours  potassium chloride  10 mEq/100 mL IVPB 10 milliEquivalent(s) IV Intermittent every 1 hour    MEDICATIONS  (PRN):  acetaminophen   Tablet .. 650 milliGRAM(s) Oral every 6 hours PRN Mild Pain (1 - 3)  ondansetron Injectable 4 milliGRAM(s) IV Push every 6 hours PRN Nausea      Vital Signs Last 24 Hrs  T(C): 36.9 (07 Aug 2021 17:30), Max: 37.2 (06 Aug 2021 21:40)  T(F): 98.4 (07 Aug 2021 17:30), Max: 98.9 (06 Aug 2021 21:40)  HR: 76 (07 Aug 2021 12:09) (76 - 90)  BP: 144/66 (07 Aug 2021 12:09) (139/79 - 177/77)  BP(mean): 95 (07 Aug 2021 12:09) (95 - 111)  RR: 17 (07 Aug 2021 12:09) (17 - 19)  SpO2: 96% (07 Aug 2021 12:09) (95% - 99%)    Physical Exam:  General: NAD, resting comfortably in bed  Pulmonary: Nonlabored breathing, no respiratory distress  Cardiovascular: NSR  Abdominal: soft, NT/ND  Extremities: WWP, normal strength  Neuro: A/O x 3, CNs II-XII grossly intact, no focal deficits, normal motor/sensation  Pulses: palpable distal pulses    I&O's Summary    06 Aug 2021 07:01  -  07 Aug 2021 07:00  --------------------------------------------------------  IN: 970 mL / OUT: 0 mL / NET: 970 mL    07 Aug 2021 07:01  -  07 Aug 2021 18:11  --------------------------------------------------------  IN: 1210 mL / OUT: 0 mL / NET: 1210 mL        LABS:                        11.9   8.20  )-----------( 212      ( 07 Aug 2021 07:29 )             36.2     08-07    138  |  104  |  15  ----------------------------<  125<H>  3.6   |  24  |  0.99    Ca    8.5      07 Aug 2021 07:29  Phos  2.9     08-07  Mg     2.2     08-07    TPro  6.9  /  Alb  3.8  /  TBili  0.8  /  DBili  x   /  AST  12  /  ALT  8<L>  /  AlkPhos  68  08-06      Urinalysis Basic - ( 06 Aug 2021 20:12 )    Color: Yellow / Appearance: Clear / S.010 / pH: x  Gluc: x / Ketone: NEGATIVE  / Bili: Negative / Urobili: 0.2 E.U./dL   Blood: x / Protein: 30 mg/dL / Nitrite: NEGATIVE   Leuk Esterase: Small / RBC: Many /HPF / WBC > 10 /HPF   Sq Epi: x / Non Sq Epi: 0-5 /HPF / Bacteria: Present /HPF      CAPILLARY BLOOD GLUCOSE        LIVER FUNCTIONS - ( 06 Aug 2021 14:08 )  Alb: 3.8 g/dL / Pro: 6.9 g/dL / ALK PHOS: 68 U/L / ALT: 8 U/L / AST: 12 U/L / GGT: x             RADIOLOGY & ADDITIONAL STUDIES:   SUBJECTIVE: Resting comfortably in bed. More cooperative vs last encounter and answers to questions regarding his condition. Reports watery stools. Denies abdominal pain, fever/chills, dizziness, dysuria, SOB, chest pain, or pain in extremities.    MEDICATIONS  (STANDING):  lactated ringers. 1000 milliLiter(s) (110 mL/Hr) IV Continuous <Continuous>  piperacillin/tazobactam IVPB.. 3.375 Gram(s) IV Intermittent every 6 hours  potassium chloride  10 mEq/100 mL IVPB 10 milliEquivalent(s) IV Intermittent every 1 hour    MEDICATIONS  (PRN):  acetaminophen   Tablet .. 650 milliGRAM(s) Oral every 6 hours PRN Mild Pain (1 - 3)  ondansetron Injectable 4 milliGRAM(s) IV Push every 6 hours PRN Nausea      Vital Signs Last 24 Hrs  T(C): 36.9 (07 Aug 2021 17:30), Max: 37.2 (06 Aug 2021 21:40)  T(F): 98.4 (07 Aug 2021 17:30), Max: 98.9 (06 Aug 2021 21:40)  HR: 76 (07 Aug 2021 12:09) (76 - 90)  BP: 144/66 (07 Aug 2021 12:09) (139/79 - 177/77)  BP(mean): 95 (07 Aug 2021 12:09) (95 - 111)  RR: 17 (07 Aug 2021 12:09) (17 - 19)  SpO2: 96% (07 Aug 2021 12:09) (95% - 99%)    Physical Exam:  General: NAD, resting comfortably in bed  Pulmonary: Nonlabored breathing, no respiratory distress  Cardiovascular: NSR  Abdominal: soft, NT/ND, or guarding  Extremities: WWP, normal strength  Neuro: A/O x 3, CNs II-XII grossly intact, no focal deficits, normal motor/sensation  Pulses: palpable distal pulses    I&O's Summary    06 Aug 2021 07:01  -  07 Aug 2021 07:00  --------------------------------------------------------  IN: 970 mL / OUT: 0 mL / NET: 970 mL    07 Aug 2021 07:01  -  07 Aug 2021 18:11  --------------------------------------------------------  IN: 1210 mL / OUT: 0 mL / NET: 1210 mL        LABS:                        11.9   8.20  )-----------( 212      ( 07 Aug 2021 07:29 )             36.2     08-07    138  |  104  |  15  ----------------------------<  125<H>  3.6   |  24  |  0.99    Ca    8.5      07 Aug 2021 07:29  Phos  2.9     08-07  Mg     2.2     08-07    TPro  6.9  /  Alb  3.8  /  TBili  0.8  /  DBili  x   /  AST  12  /  ALT  8<L>  /  AlkPhos  68  08-06      Urinalysis Basic - ( 06 Aug 2021 20:12 )    Color: Yellow / Appearance: Clear / S.010 / pH: x  Gluc: x / Ketone: NEGATIVE  / Bili: Negative / Urobili: 0.2 E.U./dL   Blood: x / Protein: 30 mg/dL / Nitrite: NEGATIVE   Leuk Esterase: Small / RBC: Many /HPF / WBC > 10 /HPF   Sq Epi: x / Non Sq Epi: 0-5 /HPF / Bacteria: Present /HPF      CAPILLARY BLOOD GLUCOSE        LIVER FUNCTIONS - ( 06 Aug 2021 14:08 )  Alb: 3.8 g/dL / Pro: 6.9 g/dL / ALK PHOS: 68 U/L / ALT: 8 U/L / AST: 12 U/L / GGT: x             RADIOLOGY & ADDITIONAL STUDIES:

## 2021-08-08 LAB
ANION GAP SERPL CALC-SCNC: 11 MMOL/L — SIGNIFICANT CHANGE UP (ref 5–17)
BUN SERPL-MCNC: 12 MG/DL — SIGNIFICANT CHANGE UP (ref 7–23)
C DIFF BY PCR RESULT: POSITIVE
C DIFF GDH STL QL: SIGNIFICANT CHANGE UP
C DIFF GDH STL QL: SIGNIFICANT CHANGE UP
C DIFF TOX GENS STL QL NAA+PROBE: SIGNIFICANT CHANGE UP
CALCIUM SERPL-MCNC: 8.6 MG/DL — SIGNIFICANT CHANGE UP (ref 8.4–10.5)
CHLORIDE SERPL-SCNC: 103 MMOL/L — SIGNIFICANT CHANGE UP (ref 96–108)
CO2 SERPL-SCNC: 23 MMOL/L — SIGNIFICANT CHANGE UP (ref 22–31)
CREAT SERPL-MCNC: 0.91 MG/DL — SIGNIFICANT CHANGE UP (ref 0.5–1.3)
CULTURE RESULTS: NO GROWTH — SIGNIFICANT CHANGE UP
GLUCOSE SERPL-MCNC: 120 MG/DL — HIGH (ref 70–99)
HCT VFR BLD CALC: 39.1 % — SIGNIFICANT CHANGE UP (ref 39–50)
HGB BLD-MCNC: 12.8 G/DL — LOW (ref 13–17)
MAGNESIUM SERPL-MCNC: 2.1 MG/DL — SIGNIFICANT CHANGE UP (ref 1.6–2.6)
MCHC RBC-ENTMCNC: 30 PG — SIGNIFICANT CHANGE UP (ref 27–34)
MCHC RBC-ENTMCNC: 32.7 GM/DL — SIGNIFICANT CHANGE UP (ref 32–36)
MCV RBC AUTO: 91.6 FL — SIGNIFICANT CHANGE UP (ref 80–100)
NRBC # BLD: 0 /100 WBCS — SIGNIFICANT CHANGE UP (ref 0–0)
PHOSPHATE SERPL-MCNC: 1.8 MG/DL — LOW (ref 2.5–4.5)
PLATELET # BLD AUTO: 225 K/UL — SIGNIFICANT CHANGE UP (ref 150–400)
POTASSIUM SERPL-MCNC: 3.2 MMOL/L — LOW (ref 3.5–5.3)
POTASSIUM SERPL-SCNC: 3.2 MMOL/L — LOW (ref 3.5–5.3)
RBC # BLD: 4.27 M/UL — SIGNIFICANT CHANGE UP (ref 4.2–5.8)
RBC # FLD: 12.8 % — SIGNIFICANT CHANGE UP (ref 10.3–14.5)
SODIUM SERPL-SCNC: 137 MMOL/L — SIGNIFICANT CHANGE UP (ref 135–145)
SPECIMEN SOURCE: SIGNIFICANT CHANGE UP
WBC # BLD: 9.21 K/UL — SIGNIFICANT CHANGE UP (ref 3.8–10.5)
WBC # FLD AUTO: 9.21 K/UL — SIGNIFICANT CHANGE UP (ref 3.8–10.5)

## 2021-08-08 PROCEDURE — 99223 1ST HOSP IP/OBS HIGH 75: CPT

## 2021-08-08 PROCEDURE — 99231 SBSQ HOSP IP/OBS SF/LOW 25: CPT

## 2021-08-08 RX ORDER — VANCOMYCIN HCL 1 G
500 VIAL (EA) INTRAVENOUS EVERY 6 HOURS
Refills: 0 | Status: DISCONTINUED | OUTPATIENT
Start: 2021-08-08 | End: 2021-08-09

## 2021-08-08 RX ORDER — LABETALOL HCL 100 MG
10 TABLET ORAL ONCE
Refills: 0 | Status: COMPLETED | OUTPATIENT
Start: 2021-08-08 | End: 2021-08-08

## 2021-08-08 RX ORDER — HYDROMORPHONE HYDROCHLORIDE 2 MG/ML
0.25 INJECTION INTRAMUSCULAR; INTRAVENOUS; SUBCUTANEOUS ONCE
Refills: 0 | Status: DISCONTINUED | OUTPATIENT
Start: 2021-08-08 | End: 2021-08-08

## 2021-08-08 RX ORDER — HYDROMORPHONE HYDROCHLORIDE 2 MG/ML
0.5 INJECTION INTRAMUSCULAR; INTRAVENOUS; SUBCUTANEOUS ONCE
Refills: 0 | Status: DISCONTINUED | OUTPATIENT
Start: 2021-08-08 | End: 2021-08-08

## 2021-08-08 RX ORDER — ACETAMINOPHEN 500 MG
1000 TABLET ORAL ONCE
Refills: 0 | Status: COMPLETED | OUTPATIENT
Start: 2021-08-08 | End: 2021-08-08

## 2021-08-08 RX ORDER — HYDRALAZINE HCL 50 MG
5 TABLET ORAL ONCE
Refills: 0 | Status: COMPLETED | OUTPATIENT
Start: 2021-08-08 | End: 2021-08-08

## 2021-08-08 RX ORDER — POTASSIUM CHLORIDE 20 MEQ
20 PACKET (EA) ORAL
Refills: 0 | Status: DISCONTINUED | OUTPATIENT
Start: 2021-08-08 | End: 2021-08-08

## 2021-08-08 RX ORDER — ACETAMINOPHEN 500 MG
1000 TABLET ORAL ONCE
Refills: 0 | Status: COMPLETED | OUTPATIENT
Start: 2021-08-09 | End: 2021-08-09

## 2021-08-08 RX ADMIN — SODIUM CHLORIDE 110 MILLILITER(S): 9 INJECTION, SOLUTION INTRAVENOUS at 06:00

## 2021-08-08 RX ADMIN — Medication 500 MILLIGRAM(S): at 03:35

## 2021-08-08 RX ADMIN — Medication 500 MILLIGRAM(S): at 17:29

## 2021-08-08 RX ADMIN — Medication 1000 MILLIGRAM(S): at 09:30

## 2021-08-08 RX ADMIN — PIPERACILLIN AND TAZOBACTAM 200 GRAM(S): 4; .5 INJECTION, POWDER, LYOPHILIZED, FOR SOLUTION INTRAVENOUS at 17:30

## 2021-08-08 RX ADMIN — Medication 5 MILLIGRAM(S): at 18:19

## 2021-08-08 RX ADMIN — Medication 500 MILLIGRAM(S): at 12:18

## 2021-08-08 RX ADMIN — HYDROMORPHONE HYDROCHLORIDE 0.5 MILLIGRAM(S): 2 INJECTION INTRAMUSCULAR; INTRAVENOUS; SUBCUTANEOUS at 23:00

## 2021-08-08 RX ADMIN — ENOXAPARIN SODIUM 40 MILLIGRAM(S): 100 INJECTION SUBCUTANEOUS at 00:44

## 2021-08-08 RX ADMIN — PIPERACILLIN AND TAZOBACTAM 200 GRAM(S): 4; .5 INJECTION, POWDER, LYOPHILIZED, FOR SOLUTION INTRAVENOUS at 05:30

## 2021-08-08 RX ADMIN — Medication 100 MILLIEQUIVALENT(S): at 00:44

## 2021-08-08 RX ADMIN — PIPERACILLIN AND TAZOBACTAM 200 GRAM(S): 4; .5 INJECTION, POWDER, LYOPHILIZED, FOR SOLUTION INTRAVENOUS at 00:01

## 2021-08-08 RX ADMIN — Medication 1000 MILLIGRAM(S): at 21:41

## 2021-08-08 RX ADMIN — PIPERACILLIN AND TAZOBACTAM 200 GRAM(S): 4; .5 INJECTION, POWDER, LYOPHILIZED, FOR SOLUTION INTRAVENOUS at 12:16

## 2021-08-08 RX ADMIN — Medication 400 MILLIGRAM(S): at 20:41

## 2021-08-08 RX ADMIN — ENOXAPARIN SODIUM 40 MILLIGRAM(S): 100 INJECTION SUBCUTANEOUS at 21:59

## 2021-08-08 RX ADMIN — Medication 400 MILLIGRAM(S): at 09:02

## 2021-08-08 RX ADMIN — HYDROMORPHONE HYDROCHLORIDE 0.5 MILLIGRAM(S): 2 INJECTION INTRAMUSCULAR; INTRAVENOUS; SUBCUTANEOUS at 22:38

## 2021-08-08 RX ADMIN — Medication 10 MILLIGRAM(S): at 15:57

## 2021-08-08 NOTE — PROGRESS NOTE ADULT - SUBJECTIVE AND OBJECTIVE BOX
Patient seen and examined at bedside.  Denied significant abdominal pain.    C. diff was positive, although toxin was negative.    Abdomen soft, NT ND.

## 2021-08-08 NOTE — CONSULT NOTE ADULT - ASSESSMENT
A/P: 86M w/urothelial carcinoma s/p TURBT 2 weeks ago, HTN adm on 8/6 w/sepsis 2/2 sigmoid diverticulitis. Plan for possible 2nd look TURBT while inpatient.    #Preop risk stratification - RCRI - 0 w/METS > 4, indicating the patient is low risk for a low to intermediate risk procedure.  - no further testing indicated    #Sepsis 2/2 sigmoid diverticulitis - possibly 2/2 C. diff? The pt was found to have (+) C. diff antigen but negative C.diff toxin by PCR.   - f/u testing to detect toxin gene sequences associated w/toxin producing C. diff  - agree w/ continuing with PO vancomycin for C. diff treatment    #UTI - patient found to be retaining with (+) UA  - f/u Urine culture results  - can c/w Zosyn for now; would deescalate based on culture data    #HTN - patient intermittently hypertensive throughout day; suspect component of agitation  - redirect if possible; treat agitation  - can start amlodipine 5  - PRN IV labetalol 10mg for SBP > 200 as long as HR > 60. If not, can use Hydralazine 10mg IVP    #Urothelial carcinoma - management as per urology A/P: 86M w/urothelial carcinoma s/p TURBT 2 weeks ago, HTN adm on 8/6 w/sepsis 2/2 sigmoid diverticulitis. Plan for possible 2nd look TURBT while inpatient.    #Preop risk stratification - RCRI - 0 w/METS > 4, indicating the patient is low risk for a low to intermediate risk procedure.  - no further testing indicated  - patient is medically optimized pending adequate control of his sepsis    #Sepsis 2/2 sigmoid diverticulitis - possibly 2/2 C. diff? The pt was found to have (+) C. diff antigen but negative C.diff toxin by PCR.   - f/u testing to detect toxin gene sequences associated w/toxin producing C. diff  - agree w/ continuing with PO vancomycin for C. diff treatment    #UTI - patient found to be retaining with (+) UA  - f/u Urine culture results  - can c/w Zosyn for now; would deescalate based on culture data    #HTN - patient intermittently hypertensive throughout day; suspect component of agitation  - redirect if possible; treat agitation  - can start amlodipine 5  - PRN IV labetalol 10mg for SBP > 200 as long as HR > 60. If not, can use Hydralazine 10mg IVP    #Urothelial carcinoma - management as per urology

## 2021-08-08 NOTE — PROGRESS NOTE ADULT - ASSESSMENT
86M, poor historian, with history of HTN and recent diagnosis of Urothelial carcinoma s/p TURBT 2 weeks ago who presented to the ED with generalized weakness, increased urinary frequency and dysuria and found to have sigmoid diverticulitis with 3.4cm abscess. Being management medically w/ IV abx. Urine cx pending. Cr normal. Retaining, would recommend quintanilla catheter. Pt was scheduled for 2nd look TURBT on Wednesday 8/11 but cancelled because he was not feeling well. Could be done tomorrow 8/9 while pt is in the hospital.    Recommendations:  - Quintanilla catheter for retention  - Flomax  - F/u Urine Cx   86M, poor historian, with history of HTN and recent diagnosis of Urothelial carcinoma s/p TURBT 2 weeks ago who presented to the ED with generalized weakness, increased urinary frequency and dysuria and found to have sigmoid diverticulitis with 3.4cm abscess. Being management medically w/ IV abx. Urine cx pending. Cr normal. Retaining, would recommend quintanilla catheter. Pt was scheduled for 2nd look TURBT on Wednesday 8/11 but cancelled because he was not feeling well. Will plan for TUIRBT  tomorrow 8/9 while pt is in the hospital.    Recommendations:  - Quintanilla catheter for retention  - Flomax  - F/u Urine Cx  - Continue abx's and care per primary team  - Planning for TURBT tomorrow 8/9/21 in the afternoon. Please obtain pre-op labs and medical clearance  - Discussed with  attending     86M, poor historian, with history of HTN and recent diagnosis of Urothelial carcinoma s/p TURBT 2 weeks ago who presented to the ED with generalized weakness, increased urinary frequency and dysuria and found to have sigmoid diverticulitis with 3.4cm abscess. Being management medically w/ IV abx. Urine cx pending. Cr normal. Retaining, would recommend quintanilla catheter. Pt was scheduled for 2nd look TURBT on Wednesday 8/11 but cancelled because he was not feeling well. May be able to perform TURBT while pt is in the hospital.    Recommendations:  - Quintanilla catheter for retention  - Flomax  - F/u Urine Cx  - Continue abx's and care per primary team  - Possible TURBT Wednesday 8/11/21, pending pt's clinical status  - Discussed with  attending

## 2021-08-08 NOTE — PROGRESS NOTE ADULT - ASSESSMENT
86-year-old male, poor historian, with history of HTN and recent diagnosis of Urotheilial carcinoma s/p TURBT 2 weeks ago who presnts with to the ED generalized weakness and increased urinary frequency and dysuria and found to have sigmoid diverticulitis associated LLQ abdominal pain and fever. Found to have c.diff    NPO/IVF  Lovenox  OOBA  Zosyn/Vanc PO

## 2021-08-08 NOTE — CONSULT NOTE ADULT - SUBJECTIVE AND OBJECTIVE BOX
Patient is a 86y old  Male who presents with a chief complaint of sigmoid diverticulitis (08 Aug 2021 13:26)  86-year-old male, poor historian, with history of HTN and recent diagnosis of Urothelial carcinoma s/p TURBT 2 weeks ago who presents with to the ED generalized weakness, increased urinary frequency and dysuria and found to have sigmoid diverticulitis associated LLQ abdominal pain and fever; endorse poor po intake. No chills, N/V/D/C; Last BM this morning, soft, non bloody; no melena, hematochezia; No SOB or chest pain. Never had symptoms like this before. Last colposcopy 20 years ago; reports had diverticula, otherwise negative; no EGD. No personal or family history of carcinoma.     PMH: HTN Urotheilial carcinoma s/p TURBT   PSH: TURBT   Meds: "takes HTN med" thinks its lisinopril, unable to recall  allergies: hay fever    Adm to surgical service on  with sigmoid diverticulitis. Plan for possible TURBT on Wednesday with Urology. Medicine consulted for comanagmeent and preop risk stratification. Patient was seen and examined at bedside with wife present, who provided most of his history. She states that he has become progressively more forgetful over the last 2 months, though it may have started more insidiously before that. He lives at home with his wife and is generally able to take care of himself, though he is forgetful. She states that he is able to walk four blocks with her before he endorses some SOB. No complaints of chest pain. Currently, he is not experiencing any chest pain or SOB.    INTERVAL HPI/OVERNIGHT EVENTS:  T(C): 36.2 (21 @ 17:20), Max: 37.1 (21 @ 01:26)  HR: 68 (21 @ 17:40) (66 - 94)  BP: 189/81 (21 @ 17:40) (126/66 - 189/81)  RR: 18 (21 @ 17:40) (18 - 18)  SpO2: 100% (21 @ 17:40) (96% - 100%)  Wt(kg): --  I&O's Summary    07 Aug 2021 07:01  -  08 Aug 2021 07:00  --------------------------------------------------------  IN: 2200 mL / OUT: 0 mL / NET: 2200 mL    08 Aug 2021 07:01  -  08 Aug 2021 18:58  --------------------------------------------------------  IN: 0 mL / OUT: 550 mL / NET: -550 mL        PAST MEDICAL & SURGICAL HISTORY:  HTN (hypertension)    Bladder cancer        SOCIAL HISTORY  Alcohol: No current alcohol use  Tobacco: Former smoker  Illicit substance use: No illicit substance use    PMD: Dr. Dario Gonzalez      FAMILY HISTORY:      LABS:                        12.8   9.21  )-----------( 225      ( 08 Aug 2021 08:29 )             39.1     08-08    137  |  103  |  12  ----------------------------<  120<H>  3.2<L>   |  23  |  0.91    Ca    8.6      08 Aug 2021 08:29  Phos  1.8     08-08  Mg     2.1     08-08        Urinalysis Basic - ( 06 Aug 2021 20:12 )    Color: Yellow / Appearance: Clear / S.010 / pH: x  Gluc: x / Ketone: NEGATIVE  / Bili: Negative / Urobili: 0.2 E.U./dL   Blood: x / Protein: 30 mg/dL / Nitrite: NEGATIVE   Leuk Esterase: Small / RBC: Many /HPF / WBC > 10 /HPF   Sq Epi: x / Non Sq Epi: 0-5 /HPF / Bacteria: Present /HPF      CAPILLARY BLOOD GLUCOSE            Urinalysis Basic - ( 06 Aug 2021 20:12 )    Color: Yellow / Appearance: Clear / S.010 / pH: x  Gluc: x / Ketone: NEGATIVE  / Bili: Negative / Urobili: 0.2 E.U./dL   Blood: x / Protein: 30 mg/dL / Nitrite: NEGATIVE   Leuk Esterase: Small / RBC: Many /HPF / WBC > 10 /HPF   Sq Epi: x / Non Sq Epi: 0-5 /HPF / Bacteria: Present /HPF        MEDICATIONS  (STANDING):  enoxaparin Injectable 40 milliGRAM(s) SubCutaneous at bedtime  lactated ringers. 1000 milliLiter(s) (110 mL/Hr) IV Continuous <Continuous>  piperacillin/tazobactam IVPB.. 3.375 Gram(s) IV Intermittent every 6 hours  vancomycin    Solution 500 milliGRAM(s) Oral every 6 hours    MEDICATIONS  (PRN):  acetaminophen   Tablet .. 650 milliGRAM(s) Oral every 6 hours PRN Mild Pain (1 - 3)  LORazepam     Tablet 1 milliGRAM(s) Oral two times a day PRN Agitation  ondansetron Injectable 4 milliGRAM(s) IV Push every 6 hours PRN Nausea      REVIEW OF SYSTEMS:  See HPI    RADIOLOGY & ADDITIONAL TESTS:    Imaging Personally Reviewed:  [ ] YES  [ ] NO    Consultant(s) Notes Reviewed:  [ ] YES  [ ] NO    PHYSICAL EXAM:  GEN: Awake, comfortable. NAD.   HEENT: NCAT, PERRL, EOMI. Mucosa moist.   NECK: Supple, no JVD.   RESP: CTA b/l  CV: RRR, normal s1/s2. No m/r/g.  ABD: Soft, NTND. BS+  EXT: Warm. No edema, clubbing, or cyanosis.   NEURO: AAOx3. No focal deficits    Care Discussed with Consultants/Other Providers [ ] YES  [ ] NO Patient is a 86y old  Male who presents with a chief complaint of sigmoid diverticulitis (08 Aug 2021 13:26)  86-year-old male, poor historian, with history of HTN and recent diagnosis of Urothelial carcinoma s/p TURBT 2 weeks ago who presents with to the ED generalized weakness, increased urinary frequency and dysuria and found to have sigmoid diverticulitis associated LLQ abdominal pain and fever; endorse poor po intake. No chills, N/V/D/C; Last BM this morning, soft, non bloody; no melena, hematochezia; No SOB or chest pain. Never had symptoms like this before. Last colposcopy 20 years ago; reports had diverticula, otherwise negative; no EGD. No personal or family history of carcinoma.     PMH: HTN Urotheilial carcinoma s/p TURBT   PSH: TURBT   Meds: "takes HTN med" thinks its lisinopril, unable to recall  allergies: hay fever    Adm to surgical service on  with sigmoid diverticulitis. Plan for possible TURBT on Wednesday with Urology. Medicine consulted for comanagmeent and preop risk stratification. Patient was seen and examined at bedside with wife present, who provided most of his history. She states that he has become progressively more forgetful over the last 2 months, though it may have started more insidiously before that. He lives at home with his wife and is generally able to take care of himself, though he is forgetful. She states that he is able to walk four blocks with her before he endorses some SOB. No complaints of chest pain. Currently, he is not experiencing any chest pain or SOB.    INTERVAL HPI/OVERNIGHT EVENTS:  T(C): 36.2 (21 @ 17:20), Max: 37.1 (21 @ 01:26)  HR: 68 (21 @ 17:40) (66 - 94)  BP: 189/81 (21 @ 17:40) (126/66 - 189/81)  RR: 18 (21 @ 17:40) (18 - 18)  SpO2: 100% (21 @ 17:40) (96% - 100%)  Wt(kg): --  I&O's Summary    07 Aug 2021 07:01  -  08 Aug 2021 07:00  --------------------------------------------------------  IN: 2200 mL / OUT: 0 mL / NET: 2200 mL    08 Aug 2021 07:01  -  08 Aug 2021 18:58  --------------------------------------------------------  IN: 0 mL / OUT: 550 mL / NET: -550 mL        PAST MEDICAL & SURGICAL HISTORY:  HTN (hypertension)    Bladder cancer        SOCIAL HISTORY  Alcohol: No current alcohol use  Tobacco: Former smoker  Illicit substance use: No illicit substance use    PMD: Dr. Dario Gonzalez      FAMILY HISTORY:      LABS:                        12.8   9.21  )-----------( 225      ( 08 Aug 2021 08:29 )             39.1     08-08    137  |  103  |  12  ----------------------------<  120<H>  3.2<L>   |  23  |  0.91    Ca    8.6      08 Aug 2021 08:29  Phos  1.8     08-08  Mg     2.1     08-08        Urinalysis Basic - ( 06 Aug 2021 20:12 )    Color: Yellow / Appearance: Clear / S.010 / pH: x  Gluc: x / Ketone: NEGATIVE  / Bili: Negative / Urobili: 0.2 E.U./dL   Blood: x / Protein: 30 mg/dL / Nitrite: NEGATIVE   Leuk Esterase: Small / RBC: Many /HPF / WBC > 10 /HPF   Sq Epi: x / Non Sq Epi: 0-5 /HPF / Bacteria: Present /HPF      CAPILLARY BLOOD GLUCOSE            Urinalysis Basic - ( 06 Aug 2021 20:12 )    Color: Yellow / Appearance: Clear / S.010 / pH: x  Gluc: x / Ketone: NEGATIVE  / Bili: Negative / Urobili: 0.2 E.U./dL   Blood: x / Protein: 30 mg/dL / Nitrite: NEGATIVE   Leuk Esterase: Small / RBC: Many /HPF / WBC > 10 /HPF   Sq Epi: x / Non Sq Epi: 0-5 /HPF / Bacteria: Present /HPF        MEDICATIONS  (STANDING):  enoxaparin Injectable 40 milliGRAM(s) SubCutaneous at bedtime  lactated ringers. 1000 milliLiter(s) (110 mL/Hr) IV Continuous <Continuous>  piperacillin/tazobactam IVPB.. 3.375 Gram(s) IV Intermittent every 6 hours  vancomycin    Solution 500 milliGRAM(s) Oral every 6 hours    MEDICATIONS  (PRN):  acetaminophen   Tablet .. 650 milliGRAM(s) Oral every 6 hours PRN Mild Pain (1 - 3)  LORazepam     Tablet 1 milliGRAM(s) Oral two times a day PRN Agitation  ondansetron Injectable 4 milliGRAM(s) IV Push every 6 hours PRN Nausea      REVIEW OF SYSTEMS:  See HPI    RADIOLOGY & ADDITIONAL TESTS:    Imaging Personally Reviewed:  [ ] YES  [ ] NO    Consultant(s) Notes Reviewed:  [ ] YES  [ ] NO    PHYSICAL EXAM:  GEN: Awake, comfortable. NAD.   HEENT: NCAT, PERRL, EOMI. Mucosa moist.   NECK: Supple, no JVD.   RESP: CTA b/l  CV: RRR, normal s1/s2. No m/r/g.  ABD: Soft, NTND. BS+  EXT: Warm. No edema, clubbing, or cyanosis.   NEURO: AAOx3. No focal deficits    Care Discussed with Consultants/Other Providers [ ] YES  [ ] NO    Admission EKG: NSR, PACs

## 2021-08-08 NOTE — PROGRESS NOTE ADULT - SUBJECTIVE AND OBJECTIVE BOX
AM progress note    SUBJECTIVE: Pt seen and examined at bedside this am by surgery team. Pain well controlled. Denies f/n/v/cp/sob.    MEDICATIONS  (STANDING):  enoxaparin Injectable 40 milliGRAM(s) SubCutaneous at bedtime  lactated ringers. 1000 milliLiter(s) (110 mL/Hr) IV Continuous <Continuous>  piperacillin/tazobactam IVPB.. 3.375 Gram(s) IV Intermittent every 6 hours  vancomycin    Solution 500 milliGRAM(s) Oral every 6 hours    MEDICATIONS  (PRN):  acetaminophen   Tablet .. 650 milliGRAM(s) Oral every 6 hours PRN Mild Pain (1 - 3)  LORazepam     Tablet 1 milliGRAM(s) Oral two times a day PRN Agitation  ondansetron Injectable 4 milliGRAM(s) IV Push every 6 hours PRN Nausea      Vital Signs Last 24 Hrs  T(C): 36.2 (08 Aug 2021 17:20), Max: 37.1 (08 Aug 2021 01:26)  T(F): 97.1 (08 Aug 2021 17:20), Max: 98.8 (08 Aug 2021 01:26)  HR: 68 (08 Aug 2021 17:40) (66 - 94)  BP: 189/81 (08 Aug 2021 17:40) (126/66 - 189/81)  BP(mean): 117 (08 Aug 2021 17:40) (91 - 122)  RR: 18 (08 Aug 2021 17:40) (18 - 18)  SpO2: 100% (08 Aug 2021 17:40) (96% - 100%)    PHYSICAL EXAM:      Constitutional: NAD    Respiratory: non labored breathing, no respiratory distress    Cardiovascular: NSR, RRR    Gastrointestinal: LLQ tenderness improved, soft, non-distended, no guarding    Extremities: (-) edema          I&O's Detail    07 Aug 2021 07:01  -  08 Aug 2021 07:00  --------------------------------------------------------  IN:    Lactated Ringers: 2200 mL  Total IN: 2200 mL    OUT:  Total OUT: 0 mL    Total NET: 2200 mL      08 Aug 2021 07:01  -  08 Aug 2021 20:13  --------------------------------------------------------  IN:  Total IN: 0 mL    OUT:    Intermittent Catheterization - Urethral (mL): 500 mL    Voided (mL): 50 mL  Total OUT: 550 mL    Total NET: -550 mL          LABS:                        12.8   9.21  )-----------( 225      ( 08 Aug 2021 08:29 )             39.1     08-08    137  |  103  |  12  ----------------------------<  120<H>  3.2<L>   |  23  |  0.91    Ca    8.6      08 Aug 2021 08:29  Phos  1.8     08-08  Mg     2.1     08-08            RADIOLOGY & ADDITIONAL STUDIES:

## 2021-08-08 NOTE — PROGRESS NOTE ADULT - SUBJECTIVE AND OBJECTIVE BOX
SUBJECTIVE: Agitated overnight. PVRs 220s-240s. +frequency, urgency incontinence.     enoxaparin Injectable 40 milliGRAM(s) SubCutaneous at bedtime  piperacillin/tazobactam IVPB.. 3.375 Gram(s) IV Intermittent every 6 hours  vancomycin    Solution 500 milliGRAM(s) Oral every 6 hours      Vital Signs Last 24 Hrs  T(C): 37 (08 Aug 2021 06:55), Max: 37.1 (07 Aug 2021 13:40)  T(F): 98.6 (08 Aug 2021 06:55), Max: 98.8 (07 Aug 2021 13:40)  HR: 90 (08 Aug 2021 08:25) (76 - 102)  BP: 180/76 (08 Aug 2021 08:25) (126/66 - 180/76)  BP(mean): 109 (08 Aug 2021 08:25) (91 - 111)  RR: 18 (08 Aug 2021 08:25) (17 - 19)  SpO2: 98% (08 Aug 2021 08:25) (96% - 98%)  I&O's Detail    07 Aug 2021 07:01  -  08 Aug 2021 07:00  --------------------------------------------------------  IN:    Lactated Ringers: 2200 mL  Total IN: 2200 mL    OUT:  Total OUT: 0 mL    Total NET: 2200 mL          Physical Exam:  General: No acute distress, resting comfortably in bed  Neuro: AOx2  Pulm: Nonlabored breathing, no respiratory distress  : no SP tenderness  Abd: soft, TTP in LLQ      LABS:                        12.8   9.21  )-----------( 225      ( 08 Aug 2021 08:29 )             39.1     08-07    138  |  104  |  15  ----------------------------<  125<H>  3.6   |  24  |  0.99    Ca    8.5      07 Aug 2021 07:29  Phos  2.9     08-07  Mg     2.2     08-07    TPro  6.9  /  Alb  3.8  /  TBili  0.8  /  DBili  x   /  AST  12  /  ALT  8<L>  /  AlkPhos  68  -      Urinalysis Basic - ( 06 Aug 2021 20:12 )    Color: Yellow / Appearance: Clear / S.010 / pH: x  Gluc: x / Ketone: NEGATIVE  / Bili: Negative / Urobili: 0.2 E.U./dL   Blood: x / Protein: 30 mg/dL / Nitrite: NEGATIVE   Leuk Esterase: Small / RBC: Many /HPF / WBC > 10 /HPF   Sq Epi: x / Non Sq Epi: 0-5 /HPF / Bacteria: Present /HPF        RADIOLOGY & ADDITIONAL STUDIES:

## 2021-08-08 NOTE — PROGRESS NOTE ADULT - ASSESSMENT
86M with sigmoid diverticulitis/C. diff.  -Continue Zosyn IV, Vancomycin PO  -Serial abdominal exams  -Appreciate  input 86M with sigmoid diverticulitis/C. diff.  -Continue Zosyn IV, Vancomycin PO  -Serial abdominal exams  -Appreciate  input  -Clear liquid diet if passes bedside swallow evaluation

## 2021-08-09 LAB
ALBUMIN SERPL ELPH-MCNC: 3.3 G/DL — SIGNIFICANT CHANGE UP (ref 3.3–5)
ALP SERPL-CCNC: 54 U/L — SIGNIFICANT CHANGE UP (ref 40–120)
ALT FLD-CCNC: 10 U/L — SIGNIFICANT CHANGE UP (ref 10–45)
ANION GAP SERPL CALC-SCNC: 12 MMOL/L — SIGNIFICANT CHANGE UP (ref 5–17)
ANION GAP SERPL CALC-SCNC: 8 MMOL/L — SIGNIFICANT CHANGE UP (ref 5–17)
AST SERPL-CCNC: 24 U/L — SIGNIFICANT CHANGE UP (ref 10–40)
BILIRUB SERPL-MCNC: 0.5 MG/DL — SIGNIFICANT CHANGE UP (ref 0.2–1.2)
BUN SERPL-MCNC: 8 MG/DL — SIGNIFICANT CHANGE UP (ref 7–23)
BUN SERPL-MCNC: 9 MG/DL — SIGNIFICANT CHANGE UP (ref 7–23)
CALCIUM SERPL-MCNC: 9 MG/DL — SIGNIFICANT CHANGE UP (ref 8.4–10.5)
CALCIUM SERPL-MCNC: 9 MG/DL — SIGNIFICANT CHANGE UP (ref 8.4–10.5)
CHLORIDE SERPL-SCNC: 101 MMOL/L — SIGNIFICANT CHANGE UP (ref 96–108)
CHLORIDE SERPL-SCNC: 104 MMOL/L — SIGNIFICANT CHANGE UP (ref 96–108)
CO2 SERPL-SCNC: 24 MMOL/L — SIGNIFICANT CHANGE UP (ref 22–31)
CO2 SERPL-SCNC: 26 MMOL/L — SIGNIFICANT CHANGE UP (ref 22–31)
CREAT SERPL-MCNC: 0.82 MG/DL — SIGNIFICANT CHANGE UP (ref 0.5–1.3)
CREAT SERPL-MCNC: 0.92 MG/DL — SIGNIFICANT CHANGE UP (ref 0.5–1.3)
GLUCOSE SERPL-MCNC: 109 MG/DL — HIGH (ref 70–99)
GLUCOSE SERPL-MCNC: 111 MG/DL — HIGH (ref 70–99)
HCT VFR BLD CALC: 40.4 % — SIGNIFICANT CHANGE UP (ref 39–50)
HCT VFR BLD CALC: 40.4 % — SIGNIFICANT CHANGE UP (ref 39–50)
HGB BLD-MCNC: 13.1 G/DL — SIGNIFICANT CHANGE UP (ref 13–17)
HGB BLD-MCNC: 13.4 G/DL — SIGNIFICANT CHANGE UP (ref 13–17)
LACTATE SERPL-SCNC: 2 MMOL/L — SIGNIFICANT CHANGE UP (ref 0.5–2)
MAGNESIUM SERPL-MCNC: 2 MG/DL — SIGNIFICANT CHANGE UP (ref 1.6–2.6)
MAGNESIUM SERPL-MCNC: 2.2 MG/DL — SIGNIFICANT CHANGE UP (ref 1.6–2.6)
MCHC RBC-ENTMCNC: 29.6 PG — SIGNIFICANT CHANGE UP (ref 27–34)
MCHC RBC-ENTMCNC: 29.9 PG — SIGNIFICANT CHANGE UP (ref 27–34)
MCHC RBC-ENTMCNC: 32.4 GM/DL — SIGNIFICANT CHANGE UP (ref 32–36)
MCHC RBC-ENTMCNC: 33.2 GM/DL — SIGNIFICANT CHANGE UP (ref 32–36)
MCV RBC AUTO: 90.2 FL — SIGNIFICANT CHANGE UP (ref 80–100)
MCV RBC AUTO: 91.2 FL — SIGNIFICANT CHANGE UP (ref 80–100)
NRBC # BLD: 0 /100 WBCS — SIGNIFICANT CHANGE UP (ref 0–0)
NRBC # BLD: 0 /100 WBCS — SIGNIFICANT CHANGE UP (ref 0–0)
PHOSPHATE SERPL-MCNC: 2.4 MG/DL — LOW (ref 2.5–4.5)
PHOSPHATE SERPL-MCNC: 3.7 MG/DL — SIGNIFICANT CHANGE UP (ref 2.5–4.5)
PLATELET # BLD AUTO: 232 K/UL — SIGNIFICANT CHANGE UP (ref 150–400)
PLATELET # BLD AUTO: 249 K/UL — SIGNIFICANT CHANGE UP (ref 150–400)
POTASSIUM SERPL-MCNC: 3.1 MMOL/L — LOW (ref 3.5–5.3)
POTASSIUM SERPL-MCNC: 3.5 MMOL/L — SIGNIFICANT CHANGE UP (ref 3.5–5.3)
POTASSIUM SERPL-SCNC: 3.1 MMOL/L — LOW (ref 3.5–5.3)
POTASSIUM SERPL-SCNC: 3.5 MMOL/L — SIGNIFICANT CHANGE UP (ref 3.5–5.3)
PROT SERPL-MCNC: 6.8 G/DL — SIGNIFICANT CHANGE UP (ref 6–8.3)
RBC # BLD: 4.43 M/UL — SIGNIFICANT CHANGE UP (ref 4.2–5.8)
RBC # BLD: 4.48 M/UL — SIGNIFICANT CHANGE UP (ref 4.2–5.8)
RBC # FLD: 12.8 % — SIGNIFICANT CHANGE UP (ref 10.3–14.5)
RBC # FLD: 12.9 % — SIGNIFICANT CHANGE UP (ref 10.3–14.5)
SODIUM SERPL-SCNC: 137 MMOL/L — SIGNIFICANT CHANGE UP (ref 135–145)
SODIUM SERPL-SCNC: 138 MMOL/L — SIGNIFICANT CHANGE UP (ref 135–145)
WBC # BLD: 10.23 K/UL — SIGNIFICANT CHANGE UP (ref 3.8–10.5)
WBC # BLD: 8.89 K/UL — SIGNIFICANT CHANGE UP (ref 3.8–10.5)
WBC # FLD AUTO: 10.23 K/UL — SIGNIFICANT CHANGE UP (ref 3.8–10.5)
WBC # FLD AUTO: 8.89 K/UL — SIGNIFICANT CHANGE UP (ref 3.8–10.5)

## 2021-08-09 PROCEDURE — 93306 TTE W/DOPPLER COMPLETE: CPT | Mod: 26

## 2021-08-09 PROCEDURE — 93010 ELECTROCARDIOGRAM REPORT: CPT

## 2021-08-09 PROCEDURE — 99233 SBSQ HOSP IP/OBS HIGH 50: CPT | Mod: GC

## 2021-08-09 RX ORDER — HALOPERIDOL DECANOATE 100 MG/ML
5 INJECTION INTRAMUSCULAR ONCE
Refills: 0 | Status: COMPLETED | OUTPATIENT
Start: 2021-08-09 | End: 2021-08-09

## 2021-08-09 RX ORDER — HALOPERIDOL DECANOATE 100 MG/ML
1 INJECTION INTRAMUSCULAR ONCE
Refills: 0 | Status: DISCONTINUED | OUTPATIENT
Start: 2021-08-09 | End: 2021-08-09

## 2021-08-09 RX ORDER — POTASSIUM CHLORIDE 20 MEQ
40 PACKET (EA) ORAL EVERY 4 HOURS
Refills: 0 | Status: COMPLETED | OUTPATIENT
Start: 2021-08-09 | End: 2021-08-09

## 2021-08-09 RX ORDER — HALOPERIDOL DECANOATE 100 MG/ML
1 INJECTION INTRAMUSCULAR ONCE
Refills: 0 | Status: COMPLETED | OUTPATIENT
Start: 2021-08-09 | End: 2021-08-09

## 2021-08-09 RX ORDER — HYDROMORPHONE HYDROCHLORIDE 2 MG/ML
0.25 INJECTION INTRAMUSCULAR; INTRAVENOUS; SUBCUTANEOUS ONCE
Refills: 0 | Status: DISCONTINUED | OUTPATIENT
Start: 2021-08-09 | End: 2021-08-09

## 2021-08-09 RX ORDER — IOHEXOL 300 MG/ML
30 INJECTION, SOLUTION INTRAVENOUS ONCE
Refills: 0 | Status: COMPLETED | OUTPATIENT
Start: 2021-08-09 | End: 2021-08-10

## 2021-08-09 RX ORDER — SODIUM CHLORIDE 9 MG/ML
1000 INJECTION, SOLUTION INTRAVENOUS
Refills: 0 | Status: DISCONTINUED | OUTPATIENT
Start: 2021-08-09 | End: 2021-08-13

## 2021-08-09 RX ORDER — VANCOMYCIN HCL 1 G
125 VIAL (EA) INTRAVENOUS EVERY 6 HOURS
Refills: 0 | Status: DISCONTINUED | OUTPATIENT
Start: 2021-08-09 | End: 2021-08-13

## 2021-08-09 RX ADMIN — HYDROMORPHONE HYDROCHLORIDE 0.25 MILLIGRAM(S): 2 INJECTION INTRAMUSCULAR; INTRAVENOUS; SUBCUTANEOUS at 00:27

## 2021-08-09 RX ADMIN — ENOXAPARIN SODIUM 40 MILLIGRAM(S): 100 INJECTION SUBCUTANEOUS at 22:48

## 2021-08-09 RX ADMIN — Medication 125 MILLIGRAM(S): at 11:45

## 2021-08-09 RX ADMIN — PIPERACILLIN AND TAZOBACTAM 200 GRAM(S): 4; .5 INJECTION, POWDER, LYOPHILIZED, FOR SOLUTION INTRAVENOUS at 11:45

## 2021-08-09 RX ADMIN — SODIUM CHLORIDE 100 MILLILITER(S): 9 INJECTION, SOLUTION INTRAVENOUS at 10:22

## 2021-08-09 RX ADMIN — Medication 400 MILLIGRAM(S): at 02:00

## 2021-08-09 RX ADMIN — HALOPERIDOL DECANOATE 1 MILLIGRAM(S): 100 INJECTION INTRAMUSCULAR at 17:21

## 2021-08-09 RX ADMIN — PIPERACILLIN AND TAZOBACTAM 200 GRAM(S): 4; .5 INJECTION, POWDER, LYOPHILIZED, FOR SOLUTION INTRAVENOUS at 00:21

## 2021-08-09 RX ADMIN — HYDROMORPHONE HYDROCHLORIDE 0.25 MILLIGRAM(S): 2 INJECTION INTRAMUSCULAR; INTRAVENOUS; SUBCUTANEOUS at 01:00

## 2021-08-09 RX ADMIN — Medication 500 MILLIGRAM(S): at 05:51

## 2021-08-09 RX ADMIN — Medication 500 MILLIGRAM(S): at 00:56

## 2021-08-09 RX ADMIN — PIPERACILLIN AND TAZOBACTAM 200 GRAM(S): 4; .5 INJECTION, POWDER, LYOPHILIZED, FOR SOLUTION INTRAVENOUS at 22:48

## 2021-08-09 RX ADMIN — HALOPERIDOL DECANOATE 5 MILLIGRAM(S): 100 INJECTION INTRAMUSCULAR at 19:18

## 2021-08-09 RX ADMIN — PIPERACILLIN AND TAZOBACTAM 200 GRAM(S): 4; .5 INJECTION, POWDER, LYOPHILIZED, FOR SOLUTION INTRAVENOUS at 05:52

## 2021-08-09 RX ADMIN — Medication 40 MILLIEQUIVALENT(S): at 09:41

## 2021-08-09 RX ADMIN — Medication 40 MILLIEQUIVALENT(S): at 11:45

## 2021-08-09 RX ADMIN — Medication 125 MILLIGRAM(S): at 19:19

## 2021-08-09 RX ADMIN — PIPERACILLIN AND TAZOBACTAM 200 GRAM(S): 4; .5 INJECTION, POWDER, LYOPHILIZED, FOR SOLUTION INTRAVENOUS at 18:16

## 2021-08-09 NOTE — PROGRESS NOTE ADULT - ASSESSMENT
86-year-old male, PMH HTN and Urotheilial carcinoma s/p TURBT 2 weeks ago, presenting w generalized weakness and increased urinary frequency/dysuria and found to have sigmoid diverticulitis associated LLQ abdominal pain and fever. Urine cultures yielded no growth. Stool C diff results positive, started on PO vancomycin 8/7. Has Han in place for retention. Urology recommended TURBT pending patient's condition    -CLD  -IVF  -IVAB (Zosyn+ vancomycin)  -SQH/SCD  - Hold urology surgery until condition improvement

## 2021-08-09 NOTE — PROGRESS NOTE ADULT - SUBJECTIVE AND OBJECTIVE BOX
SUBJECTIVE: Resting comfortably in bed. Denies abdominal pain, fever/chills, dizziness, dysuria, SOB, chest pain, or pain in extremities.    MEDICATIONS  (STANDING):  enoxaparin Injectable 40 milliGRAM(s) SubCutaneous at bedtime  lactated ringers 1000 milliLiter(s) (100 mL/Hr) IV Continuous <Continuous>  piperacillin/tazobactam IVPB.. 3.375 Gram(s) IV Intermittent every 6 hours  potassium chloride   Powder 40 milliEquivalent(s) Oral every 4 hours  vancomycin    Solution 500 milliGRAM(s) Oral every 6 hours    MEDICATIONS  (PRN):  acetaminophen   Tablet .. 650 milliGRAM(s) Oral every 6 hours PRN Mild Pain (1 - 3)  LORazepam     Tablet 1 milliGRAM(s) Oral two times a day PRN Agitation  ondansetron Injectable 4 milliGRAM(s) IV Push every 6 hours PRN Nausea      Vital Signs Last 24 Hrs  T(C): 35.9 (08 Aug 2021 21:45), Max: 36.3 (08 Aug 2021 09:41)  T(F): 96.7 (08 Aug 2021 21:45), Max: 97.4 (08 Aug 2021 09:41)  HR: 62 (09 Aug 2021 08:10) (62 - 82)  BP: 161/78 (09 Aug 2021 08:10) (159/80 - 189/81)  BP(mean): 112 (09 Aug 2021 08:10) (110 - 122)  RR: 18 (09 Aug 2021 08:10) (18 - 18)  SpO2: 98% (09 Aug 2021 08:10) (96% - 100%)    Physical Exam:  General: NAD, resting comfortably in bed  Pulmonary: Nonlabored breathing, no respiratory distress  Cardiovascular: NSR  Abdominal: soft, NT/ND, or guarding  Pulses: palpable distal pulses    I&O's Summary    08 Aug 2021 07:01  -  09 Aug 2021 07:00  --------------------------------------------------------  IN: 1160 mL / OUT: 1300 mL / NET: -140 mL        LABS:                        13.1   8.89  )-----------( 232      ( 09 Aug 2021 06:44 )             40.4     08-09    138  |  104  |  8   ----------------------------<  109<H>  3.1<L>   |  26  |  0.82    Ca    9.0      09 Aug 2021 06:44  Phos  3.7     08-09  Mg     2.2     08-09          CAPILLARY BLOOD GLUCOSE            RADIOLOGY & ADDITIONAL STUDIES:

## 2021-08-09 NOTE — PROVIDER CONTACT NOTE (OTHER) - SITUATION
Patient refusing telemetry, blood pressure, and temperature check. Refusing all medications. Refusing IV access. On 1:1 due to attempts to get out of bed and pulling out Han. Remains AOx1-2.

## 2021-08-09 NOTE — PROGRESS NOTE ADULT - SUBJECTIVE AND OBJECTIVE BOX
PADMAJA@12:00AM    Subjective: Patient states he has abdominal discomfort but no pain. He has been passing flatus and large number of liquid stools associated with C-diff infection. Denies nausea or vomiting.    T(C): 35.9 (08-08-21 @ 21:45), Max: 37 (08-08-21 @ 06:55)  HR: 82 (08-08-21 @ 20:24) (66 - 90)  BP: 169/77 (08-08-21 @ 20:24) (159/80 - 189/81)  RR: 18 (08-08-21 @ 20:24) (18 - 18)  SpO2: 99% (08-08-21 @ 20:24) (96% - 100%)    Abdominal Exam:  Soft, mildly distended, TTP in lower quadrants, no rebound tenderness, no guarding, no masses.     Plan: Will continue to monitor with serial abdominal exams.

## 2021-08-09 NOTE — PROGRESS NOTE ADULT - SUBJECTIVE AND OBJECTIVE BOX
Patient is a 86y old  Male who presents with a chief complaint of sigmoid diverticulitic (09 Aug 2021 10:40)      INTERVAL HPI/OVERNIGHT EVENTS:  Patient was seen and examined at bedside. As per nurse and patient, no o/n events, patient resting comfortably. Endorses mild abdominal discomfort however has improved. Had diarrhea overnight that has slowed down, with +flatus. Patient denies: fever, chills, dizziness, weakness, HA, Changes in vision, CP, palpitations, SOB, cough, dysuria.      PAST MEDICAL & SURGICAL HISTORY:  HTN (hypertension)    Bladder cancer        T(C): 35.9 (08-08-21 @ 21:45), Max: 36.3 (08-08-21 @ 13:09)  HR: 62 (08-09-21 @ 08:10) (62 - 82)  BP: 161/78 (08-09-21 @ 08:10) (161/78 - 189/81)  RR: 18 (08-09-21 @ 08:10) (18 - 18)  SpO2: 98% (08-09-21 @ 08:10) (98% - 100%)  Wt(kg): --  I&O's Summary    08 Aug 2021 07:01  -  09 Aug 2021 07:00  --------------------------------------------------------  IN: 1160 mL / OUT: 1300 mL / NET: -140 mL        PHYSICAL EXAM:  GENERAL: NAD, laying comfortably in bed  HEAD:  Atraumatic, Normocephalic  EYES: EOMI, PERRLA, conjunctiva and sclera clear  ENMT: No tonsillar erythema, exudates, or enlargement; MMM  NECK: Supple, No JVD  NERVOUS SYSTEM:  Alert & Oriented X3, no focal deficits   CHEST/LUNG: Clear to percussion bilaterally; No rales, rhonchi, wheezing, or rubs  HEART: Regular rate and rhythm; No murmurs, rubs, or gallops  ABDOMEN: Soft, Nontender, Nondistended; +quintanilla with light pink urine  EXTREMITIES:  2+ Peripheral Pulses, No clubbing, cyanosis, or edema  LYMPH: No lymphadenopathy noted  SKIN: No rashes or lesions        LABS:                        13.1   8.89  )-----------( 232      ( 09 Aug 2021 06:44 )             40.4     08-09    138  |  104  |  8   ----------------------------<  109<H>  3.1<L>   |  26  |  0.82    Ca    9.0      09 Aug 2021 06:44  Phos  3.7     08-09  Mg     2.2     08-09          CAPILLARY BLOOD GLUCOSE                MEDICATIONS  (STANDING):  enoxaparin Injectable 40 milliGRAM(s) SubCutaneous at bedtime  lactated ringers 1000 milliLiter(s) (100 mL/Hr) IV Continuous <Continuous>  piperacillin/tazobactam IVPB.. 3.375 Gram(s) IV Intermittent every 6 hours  vancomycin    Solution 125 milliGRAM(s) Oral every 6 hours    MEDICATIONS  (PRN):  acetaminophen   Tablet .. 650 milliGRAM(s) Oral every 6 hours PRN Mild Pain (1 - 3)  LORazepam     Tablet 1 milliGRAM(s) Oral two times a day PRN Agitation  ondansetron Injectable 4 milliGRAM(s) IV Push every 6 hours PRN Nausea      RADIOLOGY & ADDITIONAL TESTS:    Imaging Personally Reviewed:  [ ] YES  [ ] NO    Consultant(s) Notes Reviewed:  [ ] YES  [ ] NO    Care Discussed with Consultants/Other Providers [ ] YES  [ ] NO

## 2021-08-09 NOTE — PROVIDER CONTACT NOTE (OTHER) - SITUATION
Urethral Catheter place at 0100 with orange cloudy urine present, 0600 urine red cloudy with blood clot present.

## 2021-08-09 NOTE — PROGRESS NOTE ADULT - ASSESSMENT
A/P: 86M w/urothelial carcinoma s/p TURBT 2 weeks ago, HTN adm on 8/6 w/sepsis 2/2 sigmoid diverticulitis. Plan for possible 2nd look TURBT while inpatient.    #Preop risk stratification - RCRI - 0 w/METS > 4, indicating the patient is low risk for a low to intermediate risk procedure.  - no further testing indicated    #Sepsis 2/2 sigmoid diverticulitis - possibly 2/2 C. diff? The pt was found to have (+) C. diff antigen but negative C.diff toxin by PCR.   - f/u testing to detect toxin gene sequences associated w/toxin producing C. diff  - agree w/ continuing with PO vancomycin 125mg q5hr for C. diff treatment as patient with multiple episodes of diarrhea and still on IV abx  - otherwise pain control, serial abdominal exam   - plan per primary team    #UTI - patient found to be retaining with (+) UA  - f/u Urine culture results  - can c/w Zosyn for now; would deescalate based on culture data (currently NGTD)    #HTN - patient intermittently hypertensive throughout day; suspect component of agitation  - redirect if possible; treat agitation  - can start amlodipine 5  - PRN IV labetalol 10mg for SBP > 200 as long as HR > 60. If not, can use Hydralazine 10mg IVP    #Urothelial carcinoma - management as per urology

## 2021-08-09 NOTE — PROGRESS NOTE ADULT - ASSESSMENT
86M, poor historian, with history of HTN and recent diagnosis of Urothelial carcinoma s/p TURBT 2 weeks ago who presented to the ED with generalized weakness, increased urinary frequency and dysuria and found to have sigmoid diverticulitis with 3.4cm abscess. Being management medically w/ IV abx. Urine cx negative. Cr normal and downtrending. Quintanilla placed overnight with some gross heme, could be 2/2 known bladder cancer vs traumatic quintanilla. Clear pink on exam this AM. Possible TURBT while inpatient, pending general surgery.    Recommendations:  - Continue to monitor UOP and color, ensure pt is adequately hydrated  - Quintanilla catheter for retention  - Flomax  - Continue abx's and care per primary team  - Possible TURBT while inpatient, pending general surgery  - Discussed with  attending

## 2021-08-09 NOTE — PROGRESS NOTE ADULT - SUBJECTIVE AND OBJECTIVE BOX
SUBJECTIVE: NAEON. Afebrile. Quintanilla placed overnight by nursing, reporting gross heme. Nursing staff report resolution of urinary discomfort after quintanilla placement.    enoxaparin Injectable 40 milliGRAM(s) SubCutaneous at bedtime  piperacillin/tazobactam IVPB.. 3.375 Gram(s) IV Intermittent every 6 hours  vancomycin    Solution 125 milliGRAM(s) Oral every 6 hours      Vital Signs Last 24 Hrs  T(C): 35.9 (08 Aug 2021 21:45), Max: 36.3 (08 Aug 2021 13:09)  T(F): 96.7 (08 Aug 2021 21:45), Max: 97.3 (08 Aug 2021 13:09)  HR: 62 (09 Aug 2021 08:10) (62 - 82)  BP: 161/78 (09 Aug 2021 08:10) (161/78 - 189/81)  BP(mean): 112 (09 Aug 2021 08:10) (110 - 122)  RR: 18 (09 Aug 2021 08:10) (18 - 18)  SpO2: 98% (09 Aug 2021 08:10) (98% - 100%)  I&O's Detail    08 Aug 2021 07:01  -  09 Aug 2021 07:00  --------------------------------------------------------  IN:    IV PiggyBack: 500 mL    Lactated Ringers: 660 mL  Total IN: 1160 mL    OUT:    Intermittent Catheterization - Urethral (mL): 1250 mL    Voided (mL): 50 mL  Total OUT: 1300 mL    Total NET: -140 mL          Physical Exam:  General: No acute distress, resting comfortably in bed  Pulm: Nonlabored breathing, no respiratory distress  Abd: soft, non-tender, non-distended  : quintanilla in place with pink clear urine.       LABS:                        13.1   8.89  )-----------( 232      ( 09 Aug 2021 06:44 )             40.4     08-09    138  |  104  |  8   ----------------------------<  109<H>  3.1<L>   |  26  |  0.82    Ca    9.0      09 Aug 2021 06:44  Phos  3.7     08-09  Mg     2.2     08-09            RADIOLOGY & ADDITIONAL STUDIES:

## 2021-08-09 NOTE — PROVIDER CONTACT NOTE (OTHER) - ASSESSMENT
pt A&Ox3, no complaints of pain since urethral catheter  inserted. Catheter bag contained cloudy red urine with blood clot present. VSS

## 2021-08-10 LAB
ANION GAP SERPL CALC-SCNC: 10 MMOL/L — SIGNIFICANT CHANGE UP (ref 5–17)
BUN SERPL-MCNC: 8 MG/DL — SIGNIFICANT CHANGE UP (ref 7–23)
CALCIUM SERPL-MCNC: 8.9 MG/DL — SIGNIFICANT CHANGE UP (ref 8.4–10.5)
CHLORIDE SERPL-SCNC: 105 MMOL/L — SIGNIFICANT CHANGE UP (ref 96–108)
CO2 SERPL-SCNC: 23 MMOL/L — SIGNIFICANT CHANGE UP (ref 22–31)
CREAT SERPL-MCNC: 0.86 MG/DL — SIGNIFICANT CHANGE UP (ref 0.5–1.3)
GLUCOSE SERPL-MCNC: 97 MG/DL — SIGNIFICANT CHANGE UP (ref 70–99)
HCT VFR BLD CALC: 40.5 % — SIGNIFICANT CHANGE UP (ref 39–50)
HGB BLD-MCNC: 13.1 G/DL — SIGNIFICANT CHANGE UP (ref 13–17)
LACTATE SERPL-SCNC: 1.3 MMOL/L — SIGNIFICANT CHANGE UP (ref 0.5–2)
MAGNESIUM SERPL-MCNC: 2 MG/DL — SIGNIFICANT CHANGE UP (ref 1.6–2.6)
MCHC RBC-ENTMCNC: 29.6 PG — SIGNIFICANT CHANGE UP (ref 27–34)
MCHC RBC-ENTMCNC: 32.3 GM/DL — SIGNIFICANT CHANGE UP (ref 32–36)
MCV RBC AUTO: 91.6 FL — SIGNIFICANT CHANGE UP (ref 80–100)
NRBC # BLD: 0 /100 WBCS — SIGNIFICANT CHANGE UP (ref 0–0)
PHOSPHATE SERPL-MCNC: 3.2 MG/DL — SIGNIFICANT CHANGE UP (ref 2.5–4.5)
PLATELET # BLD AUTO: 237 K/UL — SIGNIFICANT CHANGE UP (ref 150–400)
POTASSIUM SERPL-MCNC: 3.8 MMOL/L — SIGNIFICANT CHANGE UP (ref 3.5–5.3)
POTASSIUM SERPL-SCNC: 3.8 MMOL/L — SIGNIFICANT CHANGE UP (ref 3.5–5.3)
RBC # BLD: 4.42 M/UL — SIGNIFICANT CHANGE UP (ref 4.2–5.8)
RBC # FLD: 12.8 % — SIGNIFICANT CHANGE UP (ref 10.3–14.5)
SODIUM SERPL-SCNC: 138 MMOL/L — SIGNIFICANT CHANGE UP (ref 135–145)
WBC # BLD: 7.73 K/UL — SIGNIFICANT CHANGE UP (ref 3.8–10.5)
WBC # FLD AUTO: 7.73 K/UL — SIGNIFICANT CHANGE UP (ref 3.8–10.5)

## 2021-08-10 PROCEDURE — 99233 SBSQ HOSP IP/OBS HIGH 50: CPT | Mod: GC

## 2021-08-10 RX ORDER — AMLODIPINE BESYLATE 2.5 MG/1
5 TABLET ORAL DAILY
Refills: 0 | Status: DISCONTINUED | OUTPATIENT
Start: 2021-08-10 | End: 2021-08-11

## 2021-08-10 RX ORDER — SODIUM CHLORIDE 9 MG/ML
1000 INJECTION, SOLUTION INTRAVENOUS ONCE
Refills: 0 | Status: DISCONTINUED | OUTPATIENT
Start: 2021-08-10 | End: 2021-08-10

## 2021-08-10 RX ORDER — LABETALOL HCL 100 MG
10 TABLET ORAL ONCE
Refills: 0 | Status: COMPLETED | OUTPATIENT
Start: 2021-08-10 | End: 2021-08-10

## 2021-08-10 RX ORDER — SODIUM CHLORIDE 9 MG/ML
500 INJECTION, SOLUTION INTRAVENOUS ONCE
Refills: 0 | Status: COMPLETED | OUTPATIENT
Start: 2021-08-10 | End: 2021-08-10

## 2021-08-10 RX ORDER — POTASSIUM CHLORIDE 20 MEQ
20 PACKET (EA) ORAL ONCE
Refills: 0 | Status: COMPLETED | OUTPATIENT
Start: 2021-08-10 | End: 2021-08-10

## 2021-08-10 RX ADMIN — AMLODIPINE BESYLATE 5 MILLIGRAM(S): 2.5 TABLET ORAL at 10:51

## 2021-08-10 RX ADMIN — Medication 125 MILLIGRAM(S): at 23:12

## 2021-08-10 RX ADMIN — PIPERACILLIN AND TAZOBACTAM 200 GRAM(S): 4; .5 INJECTION, POWDER, LYOPHILIZED, FOR SOLUTION INTRAVENOUS at 06:26

## 2021-08-10 RX ADMIN — SODIUM CHLORIDE 500 MILLILITER(S): 9 INJECTION, SOLUTION INTRAVENOUS at 04:25

## 2021-08-10 RX ADMIN — Medication 125 MILLIGRAM(S): at 13:08

## 2021-08-10 RX ADMIN — Medication 20 MILLIEQUIVALENT(S): at 10:51

## 2021-08-10 RX ADMIN — Medication 125 MILLIGRAM(S): at 17:29

## 2021-08-10 RX ADMIN — Medication 10 MILLIGRAM(S): at 17:54

## 2021-08-10 RX ADMIN — PIPERACILLIN AND TAZOBACTAM 200 GRAM(S): 4; .5 INJECTION, POWDER, LYOPHILIZED, FOR SOLUTION INTRAVENOUS at 17:29

## 2021-08-10 RX ADMIN — IOHEXOL 30 MILLILITER(S): 300 INJECTION, SOLUTION INTRAVENOUS at 07:40

## 2021-08-10 RX ADMIN — SODIUM CHLORIDE 50 MILLILITER(S): 9 INJECTION, SOLUTION INTRAVENOUS at 17:28

## 2021-08-10 RX ADMIN — SODIUM CHLORIDE 100 MILLILITER(S): 9 INJECTION, SOLUTION INTRAVENOUS at 04:12

## 2021-08-10 RX ADMIN — Medication 1 MILLIGRAM(S): at 01:41

## 2021-08-10 RX ADMIN — PIPERACILLIN AND TAZOBACTAM 200 GRAM(S): 4; .5 INJECTION, POWDER, LYOPHILIZED, FOR SOLUTION INTRAVENOUS at 23:09

## 2021-08-10 RX ADMIN — PIPERACILLIN AND TAZOBACTAM 200 GRAM(S): 4; .5 INJECTION, POWDER, LYOPHILIZED, FOR SOLUTION INTRAVENOUS at 13:07

## 2021-08-10 RX ADMIN — ENOXAPARIN SODIUM 40 MILLIGRAM(S): 100 INJECTION SUBCUTANEOUS at 22:40

## 2021-08-10 NOTE — PROGRESS NOTE ADULT - SUBJECTIVE AND OBJECTIVE BOX
SUBJECTIVE: Feeling well, calm and oriented, no complaints, +BF. Patient seen and examined bedside by chief resident.    enoxaparin Injectable 40 milliGRAM(s) SubCutaneous at bedtime  piperacillin/tazobactam IVPB.. 3.375 Gram(s) IV Intermittent every 6 hours  vancomycin    Solution 125 milliGRAM(s) Oral every 6 hours    MEDICATIONS  (PRN):  acetaminophen   Tablet .. 650 milliGRAM(s) Oral every 6 hours PRN Mild Pain (1 - 3)  ondansetron Injectable 4 milliGRAM(s) IV Push every 6 hours PRN Nausea      I&O's Detail    09 Aug 2021 07:01  -  10 Aug 2021 07:00  --------------------------------------------------------  IN:    IV PiggyBack: 200 mL    Lactated Ringers Bolus: 500 mL    Lactated Ringers w/ Additives: 1200 mL  Total IN: 1900 mL    OUT:    Indwelling Catheter - Urethral (mL): 800 mL    Voided (mL): 350 mL  Total OUT: 1150 mL    Total NET: 750 mL          Vital Signs Last 24 Hrs  T(C): 36.7 (10 Aug 2021 05:09), Max: 36.7 (10 Aug 2021 05:09)  T(F): 98 (10 Aug 2021 05:09), Max: 98 (10 Aug 2021 05:09)  HR: 68 (10 Aug 2021 03:30) (62 - 122)  BP: 157/67 (10 Aug 2021 03:30) (135/62 - 197/87)  BP(mean): 97 (10 Aug 2021 03:30) (89 - 125)  RR: 18 (10 Aug 2021 03:30) (18 - 18)  SpO2: 96% (10 Aug 2021 03:30) (95% - 100%)    General: NAD, resting comfortably in bed  C/V: NSR  Pulm: Nonlabored breathing, no respiratory distress  Abd: soft, nondistended, slight TTP in LLQ  Extrem: SCDs in place    LABS:                        13.1   7.73  )-----------( 237      ( 10 Aug 2021 07:05 )             40.5     08-10    138  |  105  |  8   ----------------------------<  97  3.8   |  23  |  0.86    Ca    8.9      10 Aug 2021 07:05  Phos  3.2     08-10  Mg     2.0     08-10    TPro  6.8  /  Alb  3.3  /  TBili  0.5  /  DBili  x   /  AST  24  /  ALT  10  /  AlkPhos  54  08-09

## 2021-08-10 NOTE — PROGRESS NOTE ADULT - SUBJECTIVE AND OBJECTIVE BOX
Patient is a 86y old  Male who presents with a chief complaint of sigmoid diverticulitic (10 Aug 2021 11:37)      INTERVAL HPI/OVERNIGHT EVENTS:  Patient was seen and examined at bedside. Overnight, patient with agitation that did not improve with haldol. improved with ativan. Patient does not recall event overnight. Currently notes +BM however not as much diarrhea as prior. Patient denies: fever, chills, dizziness, weakness, HA, Changes in vision, CP, palpitations, SOB, cough, dysuria.      PAST MEDICAL & SURGICAL HISTORY:  HTN (hypertension)    Bladder cancer        T(C): 36.7 (08-10-21 @ 05:09), Max: 36.7 (08-10-21 @ 05:09)  HR: 94 (08-10-21 @ 10:30) (68 - 122)  BP: 182/82 (08-10-21 @ 10:30) (135/62 - 197/87)  RR: 18 (08-10-21 @ 10:30) (18 - 18)  SpO2: 97% (08-10-21 @ 10:30) (95% - 98%)  Wt(kg): --  I&O's Summary    09 Aug 2021 07:01  -  10 Aug 2021 07:00  --------------------------------------------------------  IN: 1900 mL / OUT: 1150 mL / NET: 750 mL        PHYSICAL EXAM:  GENERAL: NAD, laying comfortably in bed  HEAD:  Atraumatic, Normocephalic  EYES: EOMI, PERRLA, conjunctiva and sclera clear  ENMT: No tonsillar erythema, exudates, or enlargement; MMM  NECK: Supple, No JVD  NERVOUS SYSTEM:  Alert & Oriented X3, no focal deficits   CHEST/LUNG: Clear to percussion bilaterally; No rales, rhonchi, wheezing, or rubs  HEART: Regular rate and rhythm; No murmurs, rubs, or gallops  ABDOMEN: Soft, Nontender, Nondistended; +quintanilla with light pink urine  EXTREMITIES:  2+ Peripheral Pulses, No clubbing, cyanosis, or edema  LYMPH: No lymphadenopathy noted  SKIN: No rashes or lesions        LABS:                        13.1   7.73  )-----------( 237      ( 10 Aug 2021 07:05 )             40.5     08-10    138  |  105  |  8   ----------------------------<  97  3.8   |  23  |  0.86    Ca    8.9      10 Aug 2021 07:05  Phos  3.2     08-10  Mg     2.0     08-10    TPro  6.8  /  Alb  3.3  /  TBili  0.5  /  DBili  x   /  AST  24  /  ALT  10  /  AlkPhos  54  08-09        CAPILLARY BLOOD GLUCOSE                MEDICATIONS  (STANDING):  amLODIPine   Tablet 5 milliGRAM(s) Oral daily  enoxaparin Injectable 40 milliGRAM(s) SubCutaneous at bedtime  lactated ringers 1000 milliLiter(s) (100 mL/Hr) IV Continuous <Continuous>  piperacillin/tazobactam IVPB.. 3.375 Gram(s) IV Intermittent every 6 hours  vancomycin    Solution 125 milliGRAM(s) Oral every 6 hours    MEDICATIONS  (PRN):  acetaminophen   Tablet .. 650 milliGRAM(s) Oral every 6 hours PRN Mild Pain (1 - 3)  ondansetron Injectable 4 milliGRAM(s) IV Push every 6 hours PRN Nausea      RADIOLOGY & ADDITIONAL TESTS:    Imaging Personally Reviewed:  [ ] YES  [ ] NO    Consultant(s) Notes Reviewed:  [ ] YES  [ ] NO    Care Discussed with Consultants/Other Providers [ ] YES  [ ] NO

## 2021-08-10 NOTE — PROGRESS NOTE ADULT - SUBJECTIVE AND OBJECTIVE BOX
SUBJECTIVE: AMS - given [id number] IM, stat labs sent, lactate normal, pt unable to be reoriented, refusing PO meds, given [id number] IV, placed on CIWA protocol, given 500cc LR bolus.    amLODIPine   Tablet 5 milliGRAM(s) Oral daily  enoxaparin Injectable 40 milliGRAM(s) SubCutaneous at bedtime  piperacillin/tazobactam IVPB.. 3.375 Gram(s) IV Intermittent every 6 hours  vancomycin    Solution 125 milliGRAM(s) Oral every 6 hours      Vital Signs Last 24 Hrs  T(C): 36.7 (10 Aug 2021 05:09), Max: 36.7 (10 Aug 2021 05:09)  T(F): 98 (10 Aug 2021 05:09), Max: 98 (10 Aug 2021 05:09)  HR: 94 (10 Aug 2021 10:30) (68 - 122)  BP: 182/82 (10 Aug 2021 10:30) (135/62 - 197/87)  BP(mean): 118 (10 Aug 2021 10:30) (89 - 125)  RR: 18 (10 Aug 2021 10:30) (18 - 18)  SpO2: 97% (10 Aug 2021 10:30) (95% - 100%)  I&O's Detail    09 Aug 2021 07:01  -  10 Aug 2021 07:00  --------------------------------------------------------  IN:    IV PiggyBack: 200 mL    Lactated Ringers Bolus: 500 mL    Lactated Ringers w/ Additives: 1200 mL  Total IN: 1900 mL    OUT:    Indwelling Catheter - Urethral (mL): 800 mL    Voided (mL): 350 mL  Total OUT: 1150 mL    Total NET: 750 mL          Physical Exam:  General: No acute distress, resting comfortably in bed  Pulm: Nonlabored breathing, no respiratory distress  Abd: soft  : No SP tenderness, urine clear pink.      LABS:                        13.1   7.73  )-----------( 237      ( 10 Aug 2021 07:05 )             40.5     08-10    138  |  105  |  8   ----------------------------<  97  3.8   |  23  |  0.86    Ca    8.9      10 Aug 2021 07:05  Phos  3.2     08-10  Mg     2.0     08-10    TPro  6.8  /  Alb  3.3  /  TBili  0.5  /  DBili  x   /  AST  24  /  ALT  10  /  AlkPhos  54  08-09          RADIOLOGY & ADDITIONAL STUDIES:

## 2021-08-10 NOTE — PROGRESS NOTE ADULT - ASSESSMENT
86M, poor historian, with history of HTN and recent diagnosis of Urothelial carcinoma s/p TURBT 2 weeks ago who presented to the ED with generalized weakness, increased urinary frequency and dysuria and found to have sigmoid diverticulitis with 3.4cm abscess. Being management medically w/ IV abx. Urine cx negative. Cr normal. Quintanilla with improving gross heme likely from known bladder cancer vs traumatic quintanilla.     Recommendations:  - Continue to monitor UOP and color, ensure pt is adequately hydrated  - Quintanilla catheter for retention  - Flomax  - Rest of care and abx's per primary team  - Will hold off on TURBT for now. May be done while inpatient if pt's clinical status improves  - Discussed with  attending

## 2021-08-10 NOTE — PROGRESS NOTE ADULT - ASSESSMENT
86-year-old male, poor historian, with history of HTN and recent diagnosis of Urotheilial carcinoma s/p TURBT 2 weeks ago who presnts with to the ED generalized weakness and increased urinary frequency and dysuria and found to have sigmoid diverticulitis associated LLQ abdominal pain and fever. Found to have c.diff    CLD/IVF  Zosyn/Vanc PO  Lovenox/SCDs  OOBA/IS  AM labs  CIWA  TURBT pending  CT AP today

## 2021-08-10 NOTE — PROGRESS NOTE ADULT - ASSESSMENT
A/P: 86M w/urothelial carcinoma s/p TURBT 2 weeks ago, HTN adm on 8/6 w/sepsis 2/2 sigmoid diverticulitis. Plan for possible 2nd look TURBT while inpatient.    #Preop risk stratification - RCRI - 0 w/METS > 4, indicating the patient is low risk for a low to intermediate risk procedure.  - no further testing indicated    #Sepsis 2/2 sigmoid diverticulitis - possibly 2/2 C. diff? The pt was found to have (+) C. diff antigen but negative C.diff toxin by PCR.   - continue with PO vancomycin 125mg q5hr for C. diff treatment as patient with multiple episodes of diarrhea and still on IV abx  - otherwise pain control, serial abdominal exam   - plan per primary team  - f/u repeat CT scan today    #UTI - patient found to be retaining with (+) UA  - f/u Urine culture results  - can c/w Zosyn for now; would deescalate based on culture data (currently NGTD)    #HTN - patient intermittently hypertensive throughout day; suspect component of agitation  - redirect if possible; treat agitation  - can start amlodipine 5, uptitrate prn  - PRN IV labetalol 10mg for SBP > 200 as long as HR > 60. If not, can use Hydralazine 10mg IVP    #Urothelial carcinoma - management as per urology    #agitation   Did not respond to haldol overnight   Unclear hx with etoh abuse in the past   Recommend CIWA protocol   Ativan PRN for agitation or CIWA >8

## 2021-08-11 ENCOUNTER — APPOINTMENT (OUTPATIENT)
Dept: UROLOGY | Facility: AMBULATORY SURGERY CENTER | Age: 86
End: 2021-08-11

## 2021-08-11 LAB
ANION GAP SERPL CALC-SCNC: 10 MMOL/L — SIGNIFICANT CHANGE UP (ref 5–17)
ANION GAP SERPL CALC-SCNC: 10 MMOL/L — SIGNIFICANT CHANGE UP (ref 5–17)
BUN SERPL-MCNC: 6 MG/DL — LOW (ref 7–23)
BUN SERPL-MCNC: 7 MG/DL — SIGNIFICANT CHANGE UP (ref 7–23)
C DIFF BY PCR RESULT: POSITIVE
C DIFF GDH STL QL: SIGNIFICANT CHANGE UP
C DIFF GDH STL QL: SIGNIFICANT CHANGE UP
C DIFF TOX GENS STL QL NAA+PROBE: SIGNIFICANT CHANGE UP
CALCIUM SERPL-MCNC: 8.8 MG/DL — SIGNIFICANT CHANGE UP (ref 8.4–10.5)
CALCIUM SERPL-MCNC: 9.2 MG/DL — SIGNIFICANT CHANGE UP (ref 8.4–10.5)
CHLORIDE SERPL-SCNC: 102 MMOL/L — SIGNIFICANT CHANGE UP (ref 96–108)
CHLORIDE SERPL-SCNC: 104 MMOL/L — SIGNIFICANT CHANGE UP (ref 96–108)
CO2 SERPL-SCNC: 22 MMOL/L — SIGNIFICANT CHANGE UP (ref 22–31)
CO2 SERPL-SCNC: 28 MMOL/L — SIGNIFICANT CHANGE UP (ref 22–31)
CREAT SERPL-MCNC: 0.82 MG/DL — SIGNIFICANT CHANGE UP (ref 0.5–1.3)
CREAT SERPL-MCNC: 0.82 MG/DL — SIGNIFICANT CHANGE UP (ref 0.5–1.3)
D DIMER BLD IA.RAPID-MCNC: 396 NG/ML DDU — HIGH
GLUCOSE SERPL-MCNC: 107 MG/DL — HIGH (ref 70–99)
GLUCOSE SERPL-MCNC: 129 MG/DL — HIGH (ref 70–99)
HCT VFR BLD CALC: 42.5 % — SIGNIFICANT CHANGE UP (ref 39–50)
HGB BLD-MCNC: 14 G/DL — SIGNIFICANT CHANGE UP (ref 13–17)
MAGNESIUM SERPL-MCNC: 2.1 MG/DL — SIGNIFICANT CHANGE UP (ref 1.6–2.6)
MAGNESIUM SERPL-MCNC: 2.2 MG/DL — SIGNIFICANT CHANGE UP (ref 1.6–2.6)
MCHC RBC-ENTMCNC: 30.6 PG — SIGNIFICANT CHANGE UP (ref 27–34)
MCHC RBC-ENTMCNC: 32.9 GM/DL — SIGNIFICANT CHANGE UP (ref 32–36)
MCV RBC AUTO: 92.8 FL — SIGNIFICANT CHANGE UP (ref 80–100)
NRBC # BLD: 0 /100 WBCS — SIGNIFICANT CHANGE UP (ref 0–0)
PHOSPHATE SERPL-MCNC: 2.7 MG/DL — SIGNIFICANT CHANGE UP (ref 2.5–4.5)
PHOSPHATE SERPL-MCNC: 3.2 MG/DL — SIGNIFICANT CHANGE UP (ref 2.5–4.5)
PLATELET # BLD AUTO: 207 K/UL — SIGNIFICANT CHANGE UP (ref 150–400)
POTASSIUM SERPL-MCNC: 3.8 MMOL/L — SIGNIFICANT CHANGE UP (ref 3.5–5.3)
POTASSIUM SERPL-MCNC: 4.2 MMOL/L — SIGNIFICANT CHANGE UP (ref 3.5–5.3)
POTASSIUM SERPL-SCNC: 3.8 MMOL/L — SIGNIFICANT CHANGE UP (ref 3.5–5.3)
POTASSIUM SERPL-SCNC: 4.2 MMOL/L — SIGNIFICANT CHANGE UP (ref 3.5–5.3)
RBC # BLD: 4.58 M/UL — SIGNIFICANT CHANGE UP (ref 4.2–5.8)
RBC # FLD: 13 % — SIGNIFICANT CHANGE UP (ref 10.3–14.5)
SODIUM SERPL-SCNC: 136 MMOL/L — SIGNIFICANT CHANGE UP (ref 135–145)
SODIUM SERPL-SCNC: 140 MMOL/L — SIGNIFICANT CHANGE UP (ref 135–145)
WBC # BLD: 8.03 K/UL — SIGNIFICANT CHANGE UP (ref 3.8–10.5)
WBC # FLD AUTO: 8.03 K/UL — SIGNIFICANT CHANGE UP (ref 3.8–10.5)

## 2021-08-11 PROCEDURE — 93010 ELECTROCARDIOGRAM REPORT: CPT

## 2021-08-11 PROCEDURE — 99233 SBSQ HOSP IP/OBS HIGH 50: CPT | Mod: GC

## 2021-08-11 RX ORDER — POTASSIUM PHOSPHATE, MONOBASIC POTASSIUM PHOSPHATE, DIBASIC 236; 224 MG/ML; MG/ML
15 INJECTION, SOLUTION INTRAVENOUS ONCE
Refills: 0 | Status: COMPLETED | OUTPATIENT
Start: 2021-08-11 | End: 2021-08-11

## 2021-08-11 RX ORDER — AMLODIPINE BESYLATE 2.5 MG/1
10 TABLET ORAL DAILY
Refills: 0 | Status: DISCONTINUED | OUTPATIENT
Start: 2021-08-11 | End: 2021-08-13

## 2021-08-11 RX ORDER — SODIUM CHLORIDE 9 MG/ML
250 INJECTION, SOLUTION INTRAVENOUS ONCE
Refills: 0 | Status: COMPLETED | OUTPATIENT
Start: 2021-08-11 | End: 2021-08-11

## 2021-08-11 RX ORDER — TAMSULOSIN HYDROCHLORIDE 0.4 MG/1
0.8 CAPSULE ORAL DAILY
Refills: 0 | Status: DISCONTINUED | OUTPATIENT
Start: 2021-08-11 | End: 2021-08-13

## 2021-08-11 RX ORDER — TAMSULOSIN HYDROCHLORIDE 0.4 MG/1
0.4 CAPSULE ORAL AT BEDTIME
Refills: 0 | Status: DISCONTINUED | OUTPATIENT
Start: 2021-08-11 | End: 2021-08-11

## 2021-08-11 RX ADMIN — PIPERACILLIN AND TAZOBACTAM 200 GRAM(S): 4; .5 INJECTION, POWDER, LYOPHILIZED, FOR SOLUTION INTRAVENOUS at 17:03

## 2021-08-11 RX ADMIN — Medication 125 MILLIGRAM(S): at 05:04

## 2021-08-11 RX ADMIN — Medication 0.5 MILLIGRAM(S): at 22:16

## 2021-08-11 RX ADMIN — PIPERACILLIN AND TAZOBACTAM 200 GRAM(S): 4; .5 INJECTION, POWDER, LYOPHILIZED, FOR SOLUTION INTRAVENOUS at 11:25

## 2021-08-11 RX ADMIN — Medication 125 MILLIGRAM(S): at 17:03

## 2021-08-11 RX ADMIN — Medication 125 MILLIGRAM(S): at 11:25

## 2021-08-11 RX ADMIN — SODIUM CHLORIDE 50 MILLILITER(S): 9 INJECTION, SOLUTION INTRAVENOUS at 16:17

## 2021-08-11 RX ADMIN — AMLODIPINE BESYLATE 10 MILLIGRAM(S): 2.5 TABLET ORAL at 09:03

## 2021-08-11 RX ADMIN — ENOXAPARIN SODIUM 40 MILLIGRAM(S): 100 INJECTION SUBCUTANEOUS at 22:16

## 2021-08-11 RX ADMIN — TAMSULOSIN HYDROCHLORIDE 0.8 MILLIGRAM(S): 0.4 CAPSULE ORAL at 12:08

## 2021-08-11 RX ADMIN — PIPERACILLIN AND TAZOBACTAM 200 GRAM(S): 4; .5 INJECTION, POWDER, LYOPHILIZED, FOR SOLUTION INTRAVENOUS at 05:03

## 2021-08-11 RX ADMIN — SODIUM CHLORIDE 250 MILLILITER(S): 9 INJECTION, SOLUTION INTRAVENOUS at 16:17

## 2021-08-11 RX ADMIN — AMLODIPINE BESYLATE 5 MILLIGRAM(S): 2.5 TABLET ORAL at 05:04

## 2021-08-11 RX ADMIN — POTASSIUM PHOSPHATE, MONOBASIC POTASSIUM PHOSPHATE, DIBASIC 62.5 MILLIMOLE(S): 236; 224 INJECTION, SOLUTION INTRAVENOUS at 17:58

## 2021-08-11 NOTE — PHYSICAL THERAPY INITIAL EVALUATION ADULT - DISCHARGE DISPOSITION, PT EVAL
Subacute rehab, pt declined rehab. If pt is going home, pt will require constant supervision from family and will be benefit from home PT

## 2021-08-11 NOTE — PHYSICAL THERAPY INITIAL EVALUATION ADULT - GAIT DEVIATIONS NOTED, PT EVAL
unsteady gait, no lose of balance, decreased heel strike and hip flexion bilaterally/decreased nadir/increased time in double stance/decreased step length

## 2021-08-11 NOTE — PHYSICAL THERAPY INITIAL EVALUATION ADULT - ASR EQUIP NEEDS DISCH PT EVAL
Pleas assess pt ambulate with rolling walker in next session, pt most likely needs rolling walker for DC home.

## 2021-08-11 NOTE — DIETITIAN INITIAL EVALUATION ADULT. - OTHER INFO
86M, poor historian, with history of HTN and recent diagnosis of Urotheilial carcinoma s/p TURBT 2 weeks ago who presnts with to the ED generalized weakness and increased urinary frequency and dysuria and found to have sigmoid diverticulitis associated LLQ abdominal pain and fever. Found to have c.diff- started on vanco.    On assessment, pt resting in bed. Interview was limited due to pt being a poor historian, hx obtained via EMR and disc with team. Pt was on CLD this am now advanced to FLD. Per RN, pt had half the broth and some water this am. Appetite has been poor. No reported n/v/c. +diarrhea 2/2 C.Diff. No abd distention. Skin WDL, karolina score 16, +1 generalized pitting edema. No pain noted. Unable to assess UBW nor appetite PTA. NKFA. Formal education deferred- RD provided encouragement of PO intake at meals focusing on foods that pt enjoys. Please see nutr recs below. RD to follow.

## 2021-08-11 NOTE — PHYSICAL THERAPY INITIAL EVALUATION ADULT - GENERAL OBSERVATIONS, REHAB EVAL
Pt received semi supine in bed with +IV, +EKG, +Han, +bed alarm, NAD. Pt left as found, NAD, call bell in reach, +bed alarm.

## 2021-08-11 NOTE — PHYSICAL THERAPY INITIAL EVALUATION ADULT - IMPAIRMENTS CONTRIBUTING TO GAIT DEVIATIONS, PT EVAL
ambulation is limited due to tachycardiac to 150 bpm during ambulation team 4 Leora is notified post PT session/impaired balance/impaired postural control/decreased strength

## 2021-08-11 NOTE — PROGRESS NOTE ADULT - SUBJECTIVE AND OBJECTIVE BOX
Patient is a 86y old  Male who presents with a chief complaint of sigmoid diverticulitic (11 Aug 2021 10:34)      INTERVAL HPI/OVERNIGHT EVENTS:  Patient was seen and examined at bedside. As per nurse and patient, no o/n events, patient resting comfortably. No complaints at this time. Endorses one BM last night. Patient denies: fever, chills, dizziness, weakness, HA, Changes in vision, CP, palpitations, SOB, cough, N/V/D/C, dysuria.      PAST MEDICAL & SURGICAL HISTORY:  HTN (hypertension)    Bladder cancer      T(C): 36.8 (08-11-21 @ 13:59), Max: 36.8 (08-11-21 @ 13:59)  HR: 108 (08-11-21 @ 12:50) (65 - 108)  BP: 157/71 (08-11-21 @ 12:50) (153/72 - 202/86)  RR: 16 (08-11-21 @ 12:50) (16 - 18)  SpO2: 95% (08-11-21 @ 12:50) (95% - 98%)  Wt(kg): --  I&O's Summary    10 Aug 2021 07:01  -  11 Aug 2021 07:00  --------------------------------------------------------  IN: 1050 mL / OUT: 2700 mL / NET: -1650 mL    11 Aug 2021 07:01  -  11 Aug 2021 14:01  --------------------------------------------------------  IN: 900 mL / OUT: 1200 mL / NET: -300 mL        PHYSICAL EXAM:  GENERAL: NAD, laying comfortably in bed  HEAD:  Atraumatic, Normocephalic  EYES: EOMI, PERRLA, conjunctiva and sclera clear  ENMT: No tonsillar erythema, exudates, or enlargement; MMM  NECK: Supple, No JVD  NERVOUS SYSTEM:  Alert & Oriented X3, no focal deficits   CHEST/LUNG: Clear to percussion bilaterally; No rales, rhonchi, wheezing, or rubs  HEART: Regular rate and rhythm; No murmurs, rubs, or gallops  ABDOMEN: Soft, Nontender, Nondistended; +quintanilla with light pink urine  EXTREMITIES:  2+ Peripheral Pulses, No clubbing, cyanosis, or edema  LYMPH: No lymphadenopathy noted  SKIN: No rashes or lesions          LABS:                        14.0   8.03  )-----------( 207      ( 11 Aug 2021 08:04 )             42.5     08-11    136  |  104  |  7   ----------------------------<  107<H>  4.2   |  22  |  0.82    Ca    8.8      11 Aug 2021 08:04  Phos  3.2     08-11  Mg     2.1     08-11    TPro  6.8  /  Alb  3.3  /  TBili  0.5  /  DBili  x   /  AST  24  /  ALT  10  /  AlkPhos  54  08-09        CAPILLARY BLOOD GLUCOSE                MEDICATIONS  (STANDING):  amLODIPine   Tablet 10 milliGRAM(s) Oral daily  enoxaparin Injectable 40 milliGRAM(s) SubCutaneous at bedtime  lactated ringers 1000 milliLiter(s) (50 mL/Hr) IV Continuous <Continuous>  piperacillin/tazobactam IVPB.. 3.375 Gram(s) IV Intermittent every 6 hours  tamsulosin 0.8 milliGRAM(s) Oral daily  vancomycin    Solution 125 milliGRAM(s) Oral every 6 hours    MEDICATIONS  (PRN):  acetaminophen   Tablet .. 650 milliGRAM(s) Oral every 6 hours PRN Mild Pain (1 - 3)  ondansetron Injectable 4 milliGRAM(s) IV Push every 6 hours PRN Nausea      RADIOLOGY & ADDITIONAL TESTS:    Imaging Personally Reviewed:  [ ] YES  [ ] NO    Consultant(s) Notes Reviewed:  [ ] YES  [ ] NO    Care Discussed with Consultants/Other Providers [ ] YES  [ ] NO

## 2021-08-11 NOTE — PROGRESS NOTE ADULT - ASSESSMENT
86-year-old male, poor historian, with history of HTN and recent diagnosis of Urotheilial carcinoma s/p TURBT 2 weeks ago who presnts with to the ED generalized weakness and increased urinary frequency and dysuria and found to have sigmoid diverticulitis associated LLQ abdominal pain and fever. Found to have c.diff    CLD/IVF  Zosyn/Vanc PO  Lovenox/SCDs  OOBA/IS  AM labs  CIWA  TURBT pending

## 2021-08-11 NOTE — PROGRESS NOTE ADULT - ASSESSMENT
86M, poor historian, with history of HTN and recent diagnosis of Urothelial carcinoma s/p TURBT 2 weeks ago who presented to the ED with generalized weakness, increased urinary frequency and dysuria and found to have sigmoid diverticulitis with 3.4cm abscess. Being management medically w/ IV abx. Urine cx negative. Cr normal. Quintanilla with improving gross heme likely from known bladder cancer vs traumatic quintanilla.     Recommendations:  - Can d/c quintanilla  - Flomax  - Rest of care and abx's per primary team  - TURBT will be done as outpatient  - Discussed with  attending

## 2021-08-11 NOTE — PROGRESS NOTE ADULT - SUBJECTIVE AND OBJECTIVE BOX
SUBJECTIVE: Calm, no complaints. Patient seen and examined bedside by chief resident.    amLODIPine   Tablet 5 milliGRAM(s) Oral daily  enoxaparin Injectable 40 milliGRAM(s) SubCutaneous at bedtime  piperacillin/tazobactam IVPB.. 3.375 Gram(s) IV Intermittent every 6 hours  tamsulosin 0.4 milliGRAM(s) Oral at bedtime  vancomycin    Solution 125 milliGRAM(s) Oral every 6 hours    MEDICATIONS  (PRN):  acetaminophen   Tablet .. 650 milliGRAM(s) Oral every 6 hours PRN Mild Pain (1 - 3)  ondansetron Injectable 4 milliGRAM(s) IV Push every 6 hours PRN Nausea      I&O's Detail    10 Aug 2021 07:01  -  11 Aug 2021 07:00  --------------------------------------------------------  IN:    IV PiggyBack: 100 mL    Lactated Ringers w/ Additives: 450 mL    Oral Fluid: 150 mL  Total IN: 700 mL    OUT:    Indwelling Catheter - Urethral (mL): 1350 mL    Intermittent Catheterization - Urethral (mL): 975 mL  Total OUT: 2325 mL    Total NET: -1625 mL          Vital Signs Last 24 Hrs  T(C): 36.7 (10 Aug 2021 22:57), Max: 36.7 (10 Aug 2021 22:57)  T(F): 98 (10 Aug 2021 22:57), Max: 98 (10 Aug 2021 22:57)  HR: 84 (11 Aug 2021 05:00) (65 - 94)  BP: 153/72 (11 Aug 2021 05:00) (153/72 - 202/86)  BP(mean): 102 (11 Aug 2021 05:00) (100 - 124)  RR: 16 (11 Aug 2021 05:00) (16 - 18)  SpO2: 96% (11 Aug 2021 05:00) (96% - 98%)    General: NAD, resting comfortably in bed  C/V: NSR  Pulm: Nonlabored breathing, no respiratory distress  Abd: soft, nondistended, slight TTP to deep palpation of LLQ w/o rebound/guarding  Extrem:  SCDs in place    LABS:                        13.1   7.73  )-----------( 237      ( 10 Aug 2021 07:05 )             40.5     08-10    138  |  105  |  8   ----------------------------<  97  3.8   |  23  |  0.86    Ca    8.9      10 Aug 2021 07:05  Phos  3.2     08-10  Mg     2.0     08-10    TPro  6.8  /  Alb  3.3  /  TBili  0.5  /  DBili  x   /  AST  24  /  ALT  10  /  AlkPhos  54  08-09

## 2021-08-11 NOTE — PROGRESS NOTE ADULT - SUBJECTIVE AND OBJECTIVE BOX
SUBJECTIVE: NAEON. Afebrile. HTN. Making good UOP. No flank or SP pain.    amLODIPine   Tablet 10 milliGRAM(s) Oral daily  enoxaparin Injectable 40 milliGRAM(s) SubCutaneous at bedtime  piperacillin/tazobactam IVPB.. 3.375 Gram(s) IV Intermittent every 6 hours  tamsulosin 0.4 milliGRAM(s) Oral at bedtime  vancomycin    Solution 125 milliGRAM(s) Oral every 6 hours      Vital Signs Last 24 Hrs  T(C): 36.2 (11 Aug 2021 09:01), Max: 36.7 (10 Aug 2021 22:57)  T(F): 97.1 (11 Aug 2021 09:01), Max: 98 (10 Aug 2021 22:57)  HR: 78 (11 Aug 2021 08:30) (65 - 86)  BP: 172/77 (11 Aug 2021 08:30) (153/72 - 202/86)  BP(mean): 111 (11 Aug 2021 08:30) (100 - 124)  RR: 16 (11 Aug 2021 08:30) (16 - 18)  SpO2: 97% (11 Aug 2021 08:30) (96% - 98%)  I&O's Detail    10 Aug 2021 07:01  -  11 Aug 2021 07:00  --------------------------------------------------------  IN:    IV PiggyBack: 200 mL    Lactated Ringers w/ Additives: 550 mL    Oral Fluid: 300 mL  Total IN: 1050 mL    OUT:    Indwelling Catheter - Urethral (mL): 2700 mL  Total OUT: 2700 mL    Total NET: -1650 mL      11 Aug 2021 07:01  -  11 Aug 2021 10:34  --------------------------------------------------------  IN:    Lactated Ringers w/ Additives: 100 mL  Total IN: 100 mL    OUT:    Indwelling Catheter - Urethral (mL): 500 mL  Total OUT: 500 mL    Total NET: -400 mL          Physical Exam:  General: No acute distress, resting comfortably in bed  Pulm: Nonlabored breathing, no respiratory distress  Abd: soft, mild LLQ ttp  : quintanilla very light pink clear urine      LABS:                        14.0   8.03  )-----------( 207      ( 11 Aug 2021 08:04 )             42.5     08-11    136  |  104  |  7   ----------------------------<  107<H>  4.2   |  22  |  0.82    Ca    8.8      11 Aug 2021 08:04  Phos  3.2     08-11  Mg     2.1     08-11    TPro  6.8  /  Alb  3.3  /  TBili  0.5  /  DBili  x   /  AST  24  /  ALT  10  /  AlkPhos  54  08-09          RADIOLOGY & ADDITIONAL STUDIES:

## 2021-08-11 NOTE — PROGRESS NOTE ADULT - ASSESSMENT
A/P: 86M w/urothelial carcinoma s/p TURBT 2 weeks ago, HTN adm on 8/6 w/sepsis 2/2 sigmoid diverticulitis. Plan for possible 2nd look TURBT while inpatient.    #Preop risk stratification - RCRI - 0 w/METS > 4, indicating the patient is low risk for a low to intermediate risk procedure.  - no further testing indicated    #Sepsis 2/2 sigmoid diverticulitis - possibly 2/2 C. diff? The pt was found to have (+) C. diff antigen but negative C.diff toxin by PCR.   - continue with PO vancomycin 125mg q5hr for C. diff treatment as patient with multiple episodes of diarrhea and still on IV abx  - otherwise pain control, serial abdominal exam   - plan per primary team  - f/u repeat CT scan today    #Sinus tachycardia   -Pt with HR 110s, with increased PVCs   -Recommend rectal temp for eval   -Recommend repeat BMP, Mg, Phos and replete K>4, Mg>2, phos >2.5  -Recommend D Dimer as patient has been relatively immobile in the hospital   -F/U repeat EKG  -Monitor I/O    #UTI - patient found to be retaining with (+) UA  - f/u Urine culture results  - can c/w Zosyn for now; would deescalate based on culture data (currently NGTD)    #HTN - patient intermittently hypertensive throughout day; suspect component of agitation  - redirect if possible; treat agitation  - Uptitrate norvasc to 10mg QD  - PRN IV labetalol 10mg for SBP > 200 as long as HR > 60. If not, can use Hydralazine 10mg IVP    #Urothelial carcinoma - management as per urology    #agitation   Unclear hx with etoh abuse in the past   Recommend CIWA protocol   Ativan PRN for agitation or CIWA >8 A/P: 86M w/urothelial carcinoma s/p TURBT 2 weeks ago, HTN adm on 8/6 w/sepsis 2/2 sigmoid diverticulitis. Plan for possible 2nd look TURBT while inpatient.    #Preop risk stratification - RCRI - 0 w/METS > 4, indicating the patient is low risk for a low to intermediate risk procedure.  - no further testing indicated    #Sepsis 2/2 sigmoid diverticulitis - possibly 2/2 C. diff? The pt was found to have (+) C. diff antigen but negative C.diff toxin by PCR.   - continue with PO vancomycin 125mg q5hr for C. diff treatment as patient with multiple episodes of diarrhea and still on IV abx  - otherwise pain control, serial abdominal exam   - plan per primary team  - f/u repeat CT scan today    #Sinus tachycardia   -Pt with HR 110s, with increased PVCs  -Ensure pain is controlled, patient is not agitated or anxious   -Recommend rectal temp for eval   -Recommend repeat BMP, Mg, Phos and replete K>4, Mg>2, phos >2.5  -Recommend D Dimer as patient has been relatively immobile in the hospital   -F/U repeat EKG  -Monitor I/O    #UTI - patient found to be retaining with (+) UA  - f/u Urine culture results  - can c/w Zosyn for now; would deescalate based on culture data (currently NGTD)    #HTN - patient intermittently hypertensive throughout day; suspect component of agitation  - redirect if possible; treat agitation  - Uptitrate norvasc to 10mg QD  - PRN IV labetalol 10mg for SBP > 200 as long as HR > 60. If not, can use Hydralazine 10mg IVP    #Urothelial carcinoma - management as per urology    #agitation   Unclear hx with etoh abuse in the past   Recommend CIWA protocol   Ativan PRN for agitation or CIWA >8

## 2021-08-11 NOTE — PHYSICAL THERAPY INITIAL EVALUATION ADULT - ADDITIONAL COMMENTS
Pt lives with spouse in an apt, denies stairs. Prior to admission, pt ambulated independently without AD.

## 2021-08-11 NOTE — PHYSICAL THERAPY INITIAL EVALUATION ADULT - PERTINENT HX OF CURRENT PROBLEM, REHAB EVAL
Pt is an 85 yo male with recent diagnosis of Urotheilial carcinoma s/p TURBT 2 weeks ago who presnts with to the ED generalized weakness and increased urinary frequency and dysuria and found to have sigmoid diverticulitis associated LLQ abdominal pain and fever. Found to have c.diff

## 2021-08-12 ENCOUNTER — TRANSCRIPTION ENCOUNTER (OUTPATIENT)
Age: 86
End: 2021-08-12

## 2021-08-12 LAB
ANION GAP SERPL CALC-SCNC: 11 MMOL/L — SIGNIFICANT CHANGE UP (ref 5–17)
BUN SERPL-MCNC: 7 MG/DL — SIGNIFICANT CHANGE UP (ref 7–23)
CALCIUM SERPL-MCNC: 8.9 MG/DL — SIGNIFICANT CHANGE UP (ref 8.4–10.5)
CHLORIDE SERPL-SCNC: 103 MMOL/L — SIGNIFICANT CHANGE UP (ref 96–108)
CO2 SERPL-SCNC: 23 MMOL/L — SIGNIFICANT CHANGE UP (ref 22–31)
CREAT SERPL-MCNC: 0.81 MG/DL — SIGNIFICANT CHANGE UP (ref 0.5–1.3)
GLUCOSE SERPL-MCNC: 115 MG/DL — HIGH (ref 70–99)
HCT VFR BLD CALC: 42.1 % — SIGNIFICANT CHANGE UP (ref 39–50)
HGB BLD-MCNC: 13.9 G/DL — SIGNIFICANT CHANGE UP (ref 13–17)
MAGNESIUM SERPL-MCNC: 1.9 MG/DL — SIGNIFICANT CHANGE UP (ref 1.6–2.6)
MCHC RBC-ENTMCNC: 29.7 PG — SIGNIFICANT CHANGE UP (ref 27–34)
MCHC RBC-ENTMCNC: 33 GM/DL — SIGNIFICANT CHANGE UP (ref 32–36)
MCV RBC AUTO: 90 FL — SIGNIFICANT CHANGE UP (ref 80–100)
NRBC # BLD: 0 /100 WBCS — SIGNIFICANT CHANGE UP (ref 0–0)
PHOSPHATE SERPL-MCNC: 3.1 MG/DL — SIGNIFICANT CHANGE UP (ref 2.5–4.5)
PLATELET # BLD AUTO: 217 K/UL — SIGNIFICANT CHANGE UP (ref 150–400)
POTASSIUM SERPL-MCNC: 3.8 MMOL/L — SIGNIFICANT CHANGE UP (ref 3.5–5.3)
POTASSIUM SERPL-SCNC: 3.8 MMOL/L — SIGNIFICANT CHANGE UP (ref 3.5–5.3)
RBC # BLD: 4.68 M/UL — SIGNIFICANT CHANGE UP (ref 4.2–5.8)
RBC # FLD: 12.8 % — SIGNIFICANT CHANGE UP (ref 10.3–14.5)
SODIUM SERPL-SCNC: 137 MMOL/L — SIGNIFICANT CHANGE UP (ref 135–145)
WBC # BLD: 8.92 K/UL — SIGNIFICANT CHANGE UP (ref 3.8–10.5)
WBC # FLD AUTO: 8.92 K/UL — SIGNIFICANT CHANGE UP (ref 3.8–10.5)

## 2021-08-12 PROCEDURE — 99233 SBSQ HOSP IP/OBS HIGH 50: CPT | Mod: GC

## 2021-08-12 RX ORDER — POTASSIUM CHLORIDE 20 MEQ
20 PACKET (EA) ORAL ONCE
Refills: 0 | Status: COMPLETED | OUTPATIENT
Start: 2021-08-12 | End: 2021-08-12

## 2021-08-12 RX ORDER — MAGNESIUM SULFATE 500 MG/ML
1 VIAL (ML) INJECTION ONCE
Refills: 0 | Status: COMPLETED | OUTPATIENT
Start: 2021-08-12 | End: 2021-08-12

## 2021-08-12 RX ADMIN — PIPERACILLIN AND TAZOBACTAM 200 GRAM(S): 4; .5 INJECTION, POWDER, LYOPHILIZED, FOR SOLUTION INTRAVENOUS at 17:56

## 2021-08-12 RX ADMIN — Medication 20 MILLIEQUIVALENT(S): at 10:53

## 2021-08-12 RX ADMIN — Medication 125 MILLIGRAM(S): at 07:13

## 2021-08-12 RX ADMIN — PIPERACILLIN AND TAZOBACTAM 200 GRAM(S): 4; .5 INJECTION, POWDER, LYOPHILIZED, FOR SOLUTION INTRAVENOUS at 23:19

## 2021-08-12 RX ADMIN — PIPERACILLIN AND TAZOBACTAM 200 GRAM(S): 4; .5 INJECTION, POWDER, LYOPHILIZED, FOR SOLUTION INTRAVENOUS at 11:01

## 2021-08-12 RX ADMIN — Medication 125 MILLIGRAM(S): at 00:40

## 2021-08-12 RX ADMIN — AMLODIPINE BESYLATE 10 MILLIGRAM(S): 2.5 TABLET ORAL at 07:12

## 2021-08-12 RX ADMIN — Medication 125 MILLIGRAM(S): at 23:19

## 2021-08-12 RX ADMIN — Medication 125 MILLIGRAM(S): at 11:00

## 2021-08-12 RX ADMIN — TAMSULOSIN HYDROCHLORIDE 0.8 MILLIGRAM(S): 0.4 CAPSULE ORAL at 11:00

## 2021-08-12 RX ADMIN — PIPERACILLIN AND TAZOBACTAM 200 GRAM(S): 4; .5 INJECTION, POWDER, LYOPHILIZED, FOR SOLUTION INTRAVENOUS at 00:41

## 2021-08-12 RX ADMIN — Medication 125 MILLIGRAM(S): at 17:57

## 2021-08-12 RX ADMIN — ENOXAPARIN SODIUM 40 MILLIGRAM(S): 100 INJECTION SUBCUTANEOUS at 21:58

## 2021-08-12 RX ADMIN — Medication 100 GRAM(S): at 10:52

## 2021-08-12 RX ADMIN — PIPERACILLIN AND TAZOBACTAM 200 GRAM(S): 4; .5 INJECTION, POWDER, LYOPHILIZED, FOR SOLUTION INTRAVENOUS at 07:13

## 2021-08-12 NOTE — PROGRESS NOTE ADULT - ASSESSMENT
A/P: 86M w/urothelial carcinoma s/p TURBT 2 weeks ago, HTN adm on 8/6 w/sepsis 2/2 sigmoid diverticulitis. Plan for possible 2nd look TURBT while inpatient.    #Preop risk stratification - RCRI - 0 w/METS > 4, indicating the patient is low risk for a low to intermediate risk procedure.  - no further testing indicated    #Sepsis 2/2 sigmoid diverticulitis - possibly 2/2 C. diff? The pt was found to have (+) C. diff antigen but negative C.diff toxin by PCR.   - continue with PO vancomycin 125mg q5hr for C. diff treatment as patient with multiple episodes of diarrhea and still on IV abx  - otherwise pain control, serial abdominal exam   - plan per primary team  - f/u repeat CT scan today    #Sinus tachycardia   Resolved, ekg with PVCs, unchanged from prior  -Ensure pain is controlled, patient is not agitated or anxious   -Monitor I/O    #UTI - patient found to be retaining with (+) UA  - f/u Urine culture results  - can c/w Zosyn for now; would deescalate based on culture data (currently NGTD) Day 6/7    #HTN - patient intermittently hypertensive throughout day; suspect component of agitation  - redirect if possible; treat agitation  - CW norvasc to 10mg QD  - PRN IV labetalol 10mg for SBP > 200 as long as HR > 60. If not, can use Hydralazine 10mg IVP    #Urothelial carcinoma - management as per urology    #agitation   Unclear hx with etoh abuse in the past   Recommend CIWA protocol   Ativan PRN for agitation or CIWA >8

## 2021-08-12 NOTE — PROGRESS NOTE ADULT - ASSESSMENT
86-year-old male, poor historian, with history of HTN and recent diagnosis of Urotheilial carcinoma s/p TURBT 2 weeks ago who presnts with to the ED generalized weakness and increased urinary frequency and dysuria and found to have sigmoid diverticulitis associated LLQ abdominal pain and fever. Found to have c.diff 8/11: tachy w PVCs     CLD/IVF  Zosyn/Vanc PO  Lovenox/SCDs  OOBA/IS  AM labs  CIWA  TURBT pending  PT recs [id number]

## 2021-08-12 NOTE — DISCHARGE NOTE PROVIDER - HOSPITAL COURSE
86-year-old male, poor historian, with history of HTN and recent diagnosis of Urotheilial carcinoma s/p TURBT 2 weeks ago who presents with to the ED generalized weakness and increased urinary frequency and dysuria and found to have sigmoid diverticulitis associated LLQ abdominal pain and fever 2/2 diverticulitis w/ abscess. Additionally patient was found to have a positive Cdif PCR. Patient was treated w/ IV antibiotics for both the diverticulitis and c dif and his symptoms resolved. Diet was advanced as tolerated and pain was well controlled on medication. On day of discharge, pt deemed stable and ready to return home with plan to follow up as an outpatient.

## 2021-08-12 NOTE — DISCHARGE NOTE PROVIDER - NSDCMRMEDTOKEN_GEN_ALL_CORE_FT
lisinopril: tab(s) orally once a day   amLODIPine 10 mg oral tablet: 1 tab(s) orally once a day  lisinopril: tab(s) orally once a day  vancomycin 125 mg oral capsule: 1 cap(s) orally every 6 hours MDD:Take every 6 hours until 8/17/21   amLODIPine 10 mg oral tablet: 1 tab(s) orally once a day  vancomycin 125 mg oral capsule: 1 cap(s) orally every 6 hours MDD:Take every 6 hours until 8/17/21

## 2021-08-12 NOTE — PROGRESS NOTE ADULT - SUBJECTIVE AND OBJECTIVE BOX
Patient is a 86y old  Male who presents with a chief complaint of sigmoid diverticulitic (12 Aug 2021 08:06)      INTERVAL HPI/OVERNIGHT EVENTS:  Patient was seen and examined at bedside. As per nurse and patient, no o/n events, patient resting comfortably. No complaints at this time.    PAST MEDICAL & SURGICAL HISTORY:  HTN (hypertension)    Bladder cancer      T(C): 37.1 (08-12-21 @ 12:04), Max: 37.7 (08-11-21 @ 14:56)  HR: 98 (08-12-21 @ 12:04) (74 - 122)  BP: 164/82 (08-12-21 @ 12:04) (148/66 - 173/82)  RR: 18 (08-12-21 @ 12:04) (16 - 18)  SpO2: 98% (08-12-21 @ 12:04) (95% - 98%)  Wt(kg): --  I&O's Summary    11 Aug 2021 07:01  -  12 Aug 2021 07:00  --------------------------------------------------------  IN: 1500 mL / OUT: 1800 mL / NET: -300 mL    12 Aug 2021 07:01  -  12 Aug 2021 12:53  --------------------------------------------------------  IN: 200 mL / OUT: 450 mL / NET: -250 mL        PHYSICAL EXAM:  GENERAL: NAD, laying comfortably in bed  HEAD:  Atraumatic, Normocephalic  EYES: EOMI, PERRLA, conjunctiva and sclera clear  ENMT: No tonsillar erythema, exudates, or enlargement; MMM  NECK: Supple, No JVD  NERVOUS SYSTEM:  Alert & Oriented X3, no focal deficits   CHEST/LUNG: Clear to percussion bilaterally; No rales, rhonchi, wheezing, or rubs  HEART: Regular rate and rhythm; No murmurs, rubs, or gallops  ABDOMEN: Soft, Nontender, Nondistended  EXTREMITIES:  2+ Peripheral Pulses, No clubbing, cyanosis, or edema  LYMPH: No lymphadenopathy noted  SKIN: No rashes or lesions        LABS:                        13.9   8.92  )-----------( 217      ( 12 Aug 2021 07:02 )             42.1     08-12    137  |  103  |  7   ----------------------------<  115<H>  3.8   |  23  |  0.81    Ca    8.9      12 Aug 2021 07:02  Phos  3.1     08-12  Mg     1.9     08-12          CAPILLARY BLOOD GLUCOSE                MEDICATIONS  (STANDING):  amLODIPine   Tablet 10 milliGRAM(s) Oral daily  enoxaparin Injectable 40 milliGRAM(s) SubCutaneous at bedtime  lactated ringers 1000 milliLiter(s) (50 mL/Hr) IV Continuous <Continuous>  piperacillin/tazobactam IVPB.. 3.375 Gram(s) IV Intermittent every 6 hours  tamsulosin 0.8 milliGRAM(s) Oral daily  vancomycin    Solution 125 milliGRAM(s) Oral every 6 hours    MEDICATIONS  (PRN):  acetaminophen   Tablet .. 650 milliGRAM(s) Oral every 6 hours PRN Mild Pain (1 - 3)  ondansetron Injectable 4 milliGRAM(s) IV Push every 6 hours PRN Nausea      RADIOLOGY & ADDITIONAL TESTS:    Imaging Personally Reviewed:  [ ] YES  [ ] NO    Consultant(s) Notes Reviewed:  [ ] YES  [ ] NO    Care Discussed with Consultants/Other Providers [ ] YES  [ ] NO

## 2021-08-12 NOTE — DISCHARGE NOTE PROVIDER - NSDCCPCAREPLAN_GEN_ALL_CORE_FT
PRINCIPAL DISCHARGE DIAGNOSIS  Diagnosis: Diverticulitis of sigmoid colon  Assessment and Plan of Treatment:       SECONDARY DISCHARGE DIAGNOSES  Diagnosis: Bladder cancer  Assessment and Plan of Treatment:     Diagnosis: Hypertension  Assessment and Plan of Treatment:     Diagnosis: Mild dementia  Assessment and Plan of Treatment:     Diagnosis: Altered mental status  Assessment and Plan of Treatment:     Diagnosis: Sepsis  Assessment and Plan of Treatment:     Diagnosis: C. difficile diarrhea  Assessment and Plan of Treatment:

## 2021-08-12 NOTE — DISCHARGE NOTE PROVIDER - NSDCFUADDINST_GEN_ALL_CORE_FT
General Discharge Instructions:  Please resume all regular home medications unless specifically advised not to take a particular medication. Also, please take any new medications as prescribed.    Please get plenty of rest, continue to ambulate several times per day, and drink adequate amounts of fluids. Avoid lifting weights greater than 5-10 lbs until you follow-up with your surgeon, who will instruct you further regarding activity restrictions.    Avoid driving or operating heavy machinery while taking pain medications.  Please follow-up with your surgeon Dr. Kumar, call [phone] to make an appointment. Please follow up with Primary Care Provider (PCP) as advised.    New medications: Vancomycin Oral 125 mg. Take every 6 hours until 8/17/21    Warning Signs:  Please call your doctor if you experience the following:  *You experience new chest pain, pressure, squeezing or tightness.  *New or worsening cough, shortness of breath, or wheeze.  *If you are vomiting and cannot keep down fluids or your medications.  *You are getting dehydrated due to continued vomiting, diarrhea, or other reasons. Signs of dehydration include dry mouth, rapid heartbeat, or feeling dizzy or faint when standing.  *You see blood or dark/black material when you vomit or have a bowel movement.  *You experience burning when you urinate, have blood in your urine, or experience a discharge.  *Your pain is not improving within 8-12 hours or is not gone within 24 hours. Call or return immediately if your pain is getting worse, changes location, or moves to your chest or back.  *You have shaking chills, or fever greater than 101.5 degrees Fahrenheit or 38 degrees Celsius.  *Any change in your symptoms, or any new symptoms that concern you.

## 2021-08-12 NOTE — PROGRESS NOTE ADULT - SUBJECTIVE AND OBJECTIVE BOX
SUBJECTIVE: Voiding, no complaints. Patient seen and examined bedside by chief resident.    amLODIPine   Tablet 10 milliGRAM(s) Oral daily  enoxaparin Injectable 40 milliGRAM(s) SubCutaneous at bedtime  piperacillin/tazobactam IVPB.. 3.375 Gram(s) IV Intermittent every 6 hours  tamsulosin 0.8 milliGRAM(s) Oral daily  vancomycin    Solution 125 milliGRAM(s) Oral every 6 hours    MEDICATIONS  (PRN):  acetaminophen   Tablet .. 650 milliGRAM(s) Oral every 6 hours PRN Mild Pain (1 - 3)  ondansetron Injectable 4 milliGRAM(s) IV Push every 6 hours PRN Nausea      I&O's Detail    11 Aug 2021 07:01  -  12 Aug 2021 07:00  --------------------------------------------------------  IN:    IV PiggyBack: 350 mL    Lactated Ringers w/ Additives: 550 mL    Oral Fluid: 600 mL  Total IN: 1500 mL    OUT:    Indwelling Catheter - Urethral (mL): 1800 mL  Total OUT: 1800 mL    Total NET: -300 mL          Vital Signs Last 24 Hrs  T(C): 36.9 (12 Aug 2021 05:09), Max: 37.7 (11 Aug 2021 14:56)  T(F): 98.4 (12 Aug 2021 05:09), Max: 99.8 (11 Aug 2021 14:56)  HR: 82 (12 Aug 2021 03:35) (78 - 118)  BP: 148/66 (12 Aug 2021 00:38) (148/66 - 172/77)  BP(mean): 112 (11 Aug 2021 19:34) (102 - 112)  RR: 18 (12 Aug 2021 03:35) (16 - 18)  SpO2: 96% (12 Aug 2021 03:35) (95% - 97%)    General: NAD, resting comfortably in bed  C/V: NSR  Pulm: Nonlabored breathing, no respiratory distress  Abd: soft, nondistended, slight but improved TTP in LLQ  Extrem: SCDs in place    LABS:                        13.9   8.92  )-----------( 217      ( 12 Aug 2021 07:02 )             42.1     08-11    140  |  102  |  6<L>  ----------------------------<  129<H>  3.8   |  28  |  0.82    Ca    9.2      11 Aug 2021 14:57  Phos  2.7     08-11  Mg     2.2     08-11

## 2021-08-12 NOTE — DISCHARGE NOTE PROVIDER - CARE PROVIDER_API CALL
Daksha Kumar  SURGERY  [address], [address]  [place], [place] [address]  Phone: [phone]  Fax: [phone]  Follow Up Time:

## 2021-08-13 ENCOUNTER — TRANSCRIPTION ENCOUNTER (OUTPATIENT)
Age: 86
End: 2021-08-13

## 2021-08-13 VITALS
SYSTOLIC BLOOD PRESSURE: 149 MMHG | RESPIRATION RATE: 17 BRPM | HEART RATE: 99 BPM | TEMPERATURE: 98 F | OXYGEN SATURATION: 97 % | DIASTOLIC BLOOD PRESSURE: 77 MMHG

## 2021-08-13 PROCEDURE — 87493 C DIFF AMPLIFIED PROBE: CPT

## 2021-08-13 PROCEDURE — 97110 THERAPEUTIC EXERCISES: CPT

## 2021-08-13 PROCEDURE — 99285 EMERGENCY DEPT VISIT HI MDM: CPT | Mod: 25

## 2021-08-13 PROCEDURE — G1004: CPT

## 2021-08-13 PROCEDURE — 97162 PT EVAL MOD COMPLEX 30 MIN: CPT

## 2021-08-13 PROCEDURE — 81001 URINALYSIS AUTO W/SCOPE: CPT

## 2021-08-13 PROCEDURE — 80048 BASIC METABOLIC PNL TOTAL CA: CPT

## 2021-08-13 PROCEDURE — 85027 COMPLETE CBC AUTOMATED: CPT

## 2021-08-13 PROCEDURE — 93306 TTE W/DOPPLER COMPLETE: CPT

## 2021-08-13 PROCEDURE — 99232 SBSQ HOSP IP/OBS MODERATE 35: CPT | Mod: GC

## 2021-08-13 PROCEDURE — 80053 COMPREHEN METABOLIC PANEL: CPT

## 2021-08-13 PROCEDURE — 87635 SARS-COV-2 COVID-19 AMP PRB: CPT

## 2021-08-13 PROCEDURE — 36415 COLL VENOUS BLD VENIPUNCTURE: CPT

## 2021-08-13 PROCEDURE — 86769 SARS-COV-2 COVID-19 ANTIBODY: CPT

## 2021-08-13 PROCEDURE — 87086 URINE CULTURE/COLONY COUNT: CPT

## 2021-08-13 PROCEDURE — 83735 ASSAY OF MAGNESIUM: CPT

## 2021-08-13 PROCEDURE — 87324 CLOSTRIDIUM AG IA: CPT

## 2021-08-13 PROCEDURE — 97116 GAIT TRAINING THERAPY: CPT

## 2021-08-13 PROCEDURE — 85379 FIBRIN DEGRADATION QUANT: CPT

## 2021-08-13 PROCEDURE — 74177 CT ABD & PELVIS W/CONTRAST: CPT | Mod: ME

## 2021-08-13 PROCEDURE — 93005 ELECTROCARDIOGRAM TRACING: CPT

## 2021-08-13 PROCEDURE — 84100 ASSAY OF PHOSPHORUS: CPT

## 2021-08-13 PROCEDURE — 83605 ASSAY OF LACTIC ACID: CPT

## 2021-08-13 PROCEDURE — 0225U NFCT DS DNA&RNA 21 SARSCOV2: CPT

## 2021-08-13 PROCEDURE — 87449 NOS EACH ORGANISM AG IA: CPT

## 2021-08-13 PROCEDURE — 97530 THERAPEUTIC ACTIVITIES: CPT

## 2021-08-13 PROCEDURE — 96365 THER/PROPH/DIAG IV INF INIT: CPT

## 2021-08-13 RX ORDER — POTASSIUM CHLORIDE 20 MEQ
20 PACKET (EA) ORAL ONCE
Refills: 0 | Status: COMPLETED | OUTPATIENT
Start: 2021-08-13 | End: 2021-08-13

## 2021-08-13 RX ORDER — MAGNESIUM SULFATE 500 MG/ML
1 VIAL (ML) INJECTION ONCE
Refills: 0 | Status: COMPLETED | OUTPATIENT
Start: 2021-08-13 | End: 2021-08-13

## 2021-08-13 RX ORDER — AMLODIPINE BESYLATE 2.5 MG/1
1 TABLET ORAL
Qty: 30 | Refills: 2
Start: 2021-08-13 | End: 2021-11-10

## 2021-08-13 RX ORDER — VANCOMYCIN HCL 1 G
1 VIAL (EA) INTRAVENOUS
Qty: 16 | Refills: 0
Start: 2021-08-13 | End: 2021-08-16

## 2021-08-13 RX ORDER — AMLODIPINE BESYLATE 2.5 MG/1
1 TABLET ORAL
Qty: 0 | Refills: 0 | DISCHARGE
Start: 2021-08-13

## 2021-08-13 RX ORDER — LISINOPRIL 2.5 MG/1
0 TABLET ORAL
Qty: 0 | Refills: 0 | DISCHARGE

## 2021-08-13 RX ADMIN — AMLODIPINE BESYLATE 10 MILLIGRAM(S): 2.5 TABLET ORAL at 05:25

## 2021-08-13 RX ADMIN — Medication 125 MILLIGRAM(S): at 05:25

## 2021-08-13 RX ADMIN — Medication 125 MILLIGRAM(S): at 17:07

## 2021-08-13 RX ADMIN — Medication 20 MILLIEQUIVALENT(S): at 10:20

## 2021-08-13 RX ADMIN — PIPERACILLIN AND TAZOBACTAM 200 GRAM(S): 4; .5 INJECTION, POWDER, LYOPHILIZED, FOR SOLUTION INTRAVENOUS at 11:29

## 2021-08-13 RX ADMIN — Medication 125 MILLIGRAM(S): at 11:30

## 2021-08-13 RX ADMIN — PIPERACILLIN AND TAZOBACTAM 200 GRAM(S): 4; .5 INJECTION, POWDER, LYOPHILIZED, FOR SOLUTION INTRAVENOUS at 05:25

## 2021-08-13 RX ADMIN — TAMSULOSIN HYDROCHLORIDE 0.8 MILLIGRAM(S): 0.4 CAPSULE ORAL at 11:30

## 2021-08-13 RX ADMIN — Medication 100 GRAM(S): at 10:20

## 2021-08-13 NOTE — PROGRESS NOTE ADULT - ATTENDING COMMENTS
Abdomen soft, NT, ND, BS+, Flatus+, BM+, tolerating diet, D/C home with F/U in the office.
Abdomen soft, distended, no guarding, no rebound, BS+, Flatus+, BM+, tolerating clear liquid diet, IV ABX, DVT prophylaxis, F/U LABS, VS
Abdomen soft, N/T, N/D, BS+, Flatus+, BM+, tolerating diet, D/C home on oral ABX with F/U in the office.
Abdomen soft, NT, N/D, BS+, Flatus+, BM+, tolerating diet, IV ABX, waiting for placement.
Abdomen soft, tender in the left lover quadrant, on rounds at 6:00 pm patient is confused, aggressive , Needs psychiatric evaluation , CT abdomen and pelvis with PO and IV contrast, F/U LABS, VS.

## 2021-08-13 NOTE — DISCHARGE NOTE NURSING/CASE MANAGEMENT/SOCIAL WORK - PATIENT PORTAL LINK FT
You can access the FollowMyHealth Patient Portal offered by [facility] by registering at the following website: [email or web address]. By joining [facility]’s FollowMyHealth portal, you will also be able to view your health information using other applications (apps) compatible with our system.

## 2021-08-13 NOTE — PROGRESS NOTE ADULT - PROVIDER SPECIALTY LIST ADULT
Surgery
Urology
Hospitalist
Hospitalist
Surgery
Hospitalist
Hospitalist
Surgery
Urology
Urology
Hospitalist
Surgery
Surgery
Urology
Surgery
Surgery

## 2021-08-13 NOTE — PROGRESS NOTE ADULT - ASSESSMENT
86-year-old male, poor historian, with history of HTN and recent diagnosis of Urotheilial carcinoma s/p TURBT 2 weeks ago who presnts with to the ED generalized weakness and increased urinary frequency and dysuria and found to have sigmoid diverticulitis associated LLQ abdominal pain and fever. Found to have c.diff    FLD/IVF  Zosyn/Vanc PO  Lovenox/SCDs  OOBA/IS  AM labs  CIWA  TURBT pending  PT recs [id number]

## 2021-08-13 NOTE — PROGRESS NOTE ADULT - REASON FOR ADMISSION
sigmoid diverticulitic
sigmoid diverticulitis
sigmoid diverticulitic
sigmoid diverticulitis
sigmoid diverticulitic

## 2021-08-13 NOTE — PROGRESS NOTE ADULT - SUBJECTIVE AND OBJECTIVE BOX
24 hr events: O/N: sundowing, ab exam benign, -p/-n/-v    SUBJECTIVE: Patient seen at bedside with no acute complaints.     Vital Signs Last 24 Hrs  T(C): 37.1 (13 Aug 2021 08:37), Max: 37.6 (12 Aug 2021 16:30)  T(F): 98.8 (13 Aug 2021 08:37), Max: 99.6 (12 Aug 2021 16:30)  HR: 102 (13 Aug 2021 08:37) (91 - 110)  BP: 132/65 (13 Aug 2021 08:37) (112/70 - 173/82)  BP(mean): 97 (12 Aug 2021 11:10) (97 - 116)  RR: 18 (13 Aug 2021 08:37) (17 - 18)  SpO2: 97% (13 Aug 2021 08:37) (95% - 98%)    Physical Exam:  General: NAD  Pulmonary: Nonlabored breathing, no respiratory distress  Cardiovascular: NSR  Abdominal: soft, NT/ND, no organomegaly  Extremities: WWP, SCDs in place    Lines/drains/tubes:    I&O's Summary    12 Aug 2021 07:01  -  13 Aug 2021 07:00  --------------------------------------------------------  IN: 1150 mL / OUT: 450 mL / NET: 700 mL    13 Aug 2021 07:01  -  13 Aug 2021 09:59  --------------------------------------------------------  IN: 150 mL / OUT: 0 mL / NET: 150 mL        LABS:                        13.9   8.92  )-----------( 217      ( 12 Aug 2021 07:02 )             42.1     08-12    137  |  103  |  7   ----------------------------<  115<H>  3.8   |  23  |  0.81    Ca    8.9      12 Aug 2021 07:02  Phos  3.1     08-12  Mg     1.9     08-12          CAPILLARY BLOOD GLUCOSE            RADIOLOGY & ADDITIONAL STUDIES:

## 2021-08-13 NOTE — PROGRESS NOTE ADULT - ASSESSMENT
A/P: 86M w/urothelial carcinoma s/p TURBT 2 weeks ago, HTN adm on 8/6 w/sepsis 2/2 sigmoid diverticulitis. Plan for possible 2nd look TURBT while inpatient.    #Preop risk stratification - RCRI - 0 w/METS > 4, indicating the patient is low risk for a low to intermediate risk procedure.  - no further testing indicated    #Sepsis 2/2 sigmoid diverticulitis - possibly 2/2 C. diff? The pt was found to have (+) C. diff antigen but negative C.diff toxin by PCR.   - continue with PO vancomycin 125mg q6 for total 10 days of therapy  - otherwise pain control, serial abdominal exam   - plan per primary team    #Sinus tachycardia   Ekg with PVCs, unchanged from prior  -Ensure pain is controlled, patient is not agitated or anxious   -ensure adequate pO intake  -Monitor I/O    #UTI - patient found to be retaining with (+) UA  - f/u Urine culture results  -Completed course of zosyn for UTI    #HTN - patient intermittently hypertensive throughout day; suspect component of agitation  - redirect if possible; treat agitation  - CW norvasc to 10mg QD  - PRN IV labetalol 10mg for SBP > 200 as long as HR > 60. If not, can use Hydralazine 10mg IVP    #Urothelial carcinoma - management as per urology    #agitation   Unclear hx with etoh abuse in the past   Recommend CIWA protocol   Ativan PRN for agitation or CIWA >8

## 2021-08-13 NOTE — PROGRESS NOTE ADULT - SUBJECTIVE AND OBJECTIVE BOX
Patient is a 86y old  Male who presents with a chief complaint of sigmoid diverticulitis (13 Aug 2021 09:59)      INTERVAL HPI/OVERNIGHT EVENTS:  Patient was seen and examined at bedside. As per nurse and patient, no o/n events, patient resting comfortably. No complaints at this time.     PAST MEDICAL & SURGICAL HISTORY:  HTN (hypertension)    Bladder cancer        T(C): 37.1 (08-13-21 @ 08:37), Max: 37.6 (08-12-21 @ 16:30)  HR: 102 (08-13-21 @ 08:37) (91 - 109)  BP: 132/65 (08-13-21 @ 08:37) (112/70 - 161/70)  RR: 18 (08-13-21 @ 08:37) (17 - 18)  SpO2: 97% (08-13-21 @ 08:37) (95% - 97%)  Wt(kg): --  I&O's Summary    12 Aug 2021 07:01  -  13 Aug 2021 07:00  --------------------------------------------------------  IN: 1150 mL / OUT: 450 mL / NET: 700 mL    13 Aug 2021 07:01  -  13 Aug 2021 13:48  --------------------------------------------------------  IN: 660 mL / OUT: 700 mL / NET: -40 mL        PHYSICAL EXAM:  GENERAL: NAD, laying comfortably in bed  HEAD:  Atraumatic, Normocephalic  EYES: EOMI, PERRLA, conjunctiva and sclera clear  ENMT: No tonsillar erythema, exudates, or enlargement; MMM  NECK: Supple, No JVD  NERVOUS SYSTEM:  Alert & Oriented X3, no focal deficits   CHEST/LUNG: Clear to percussion bilaterally; No rales, rhonchi, wheezing, or rubs  HEART: Regular rate and rhythm; No murmurs, rubs, or gallops  ABDOMEN: Soft, Nontender, Nondistended; Bowel sounds present  EXTREMITIES:  2+ Peripheral Pulses, No clubbing, cyanosis, or edema  LYMPH: No lymphadenopathy noted  SKIN: No rashes or lesions        LABS:                        13.9   8.92  )-----------( 217      ( 12 Aug 2021 07:02 )             42.1     08-12    137  |  103  |  7   ----------------------------<  115<H>  3.8   |  23  |  0.81    Ca    8.9      12 Aug 2021 07:02  Phos  3.1     08-12  Mg     1.9     08-12          CAPILLARY BLOOD GLUCOSE                MEDICATIONS  (STANDING):  amLODIPine   Tablet 10 milliGRAM(s) Oral daily  enoxaparin Injectable 40 milliGRAM(s) SubCutaneous at bedtime  lactated ringers 1000 milliLiter(s) (50 mL/Hr) IV Continuous <Continuous>  piperacillin/tazobactam IVPB.. 3.375 Gram(s) IV Intermittent every 6 hours  tamsulosin 0.8 milliGRAM(s) Oral daily  vancomycin    Solution 125 milliGRAM(s) Oral every 6 hours    MEDICATIONS  (PRN):  acetaminophen   Tablet .. 650 milliGRAM(s) Oral every 6 hours PRN Mild Pain (1 - 3)  ondansetron Injectable 4 milliGRAM(s) IV Push every 6 hours PRN Nausea      RADIOLOGY & ADDITIONAL TESTS:    Imaging Personally Reviewed:  [ ] YES  [ ] NO    Consultant(s) Notes Reviewed:  [ ] YES  [ ] NO    Care Discussed with Consultants/Other Providers [ ] YES  [ ] NO

## 2021-08-19 DIAGNOSIS — Z41.8 ENCOUNTER FOR OTHER PROCEDURES FOR PURPOSES OTHER THAN REMEDYING HEALTH STATE: ICD-10-CM

## 2021-08-19 DIAGNOSIS — R31.0 GROSS HEMATURIA: ICD-10-CM

## 2021-08-19 DIAGNOSIS — R45.1 RESTLESSNESS AND AGITATION: ICD-10-CM

## 2021-08-19 DIAGNOSIS — K57.20 DIVERTICULITIS OF LARGE INTESTINE WITH PERFORATION AND ABSCESS WITHOUT BLEEDING: ICD-10-CM

## 2021-08-19 DIAGNOSIS — Z87.891 PERSONAL HISTORY OF NICOTINE DEPENDENCE: ICD-10-CM

## 2021-08-19 DIAGNOSIS — A41.9 SEPSIS, UNSPECIFIED ORGANISM: ICD-10-CM

## 2021-08-19 DIAGNOSIS — A04.72 ENTEROCOLITIS DUE TO CLOSTRIDIUM DIFFICILE, NOT SPECIFIED AS RECURRENT: ICD-10-CM

## 2021-08-19 DIAGNOSIS — C68.8 MALIGNANT NEOPLASM OF OVERLAPPING SITES OF URINARY ORGANS: ICD-10-CM

## 2021-08-19 DIAGNOSIS — F03.90 UNSPECIFIED DEMENTIA, UNSPECIFIED SEVERITY, WITHOUT BEHAVIORAL DISTURBANCE, PSYCHOTIC DISTURBANCE, MOOD DISTURBANCE, AND ANXIETY: ICD-10-CM

## 2021-08-19 DIAGNOSIS — R00.0 TACHYCARDIA, UNSPECIFIED: ICD-10-CM

## 2021-08-19 DIAGNOSIS — N39.0 URINARY TRACT INFECTION, SITE NOT SPECIFIED: ICD-10-CM

## 2021-08-19 DIAGNOSIS — R33.9 RETENTION OF URINE, UNSPECIFIED: ICD-10-CM

## 2021-08-19 DIAGNOSIS — I10 ESSENTIAL (PRIMARY) HYPERTENSION: ICD-10-CM

## 2021-08-31 ENCOUNTER — APPOINTMENT (OUTPATIENT)
Dept: UROLOGY | Facility: CLINIC | Age: 86
End: 2021-08-31

## 2021-11-02 NOTE — ED ADULT NURSE NOTE - NS ED NURSE LEVEL OF CONSCIOUSNESS SPEECH
(2) Severe dysarthria; patients speech is so slurred as to be unintelligible in the absence of or out of proportion to any dysphasia, or is mute/anarthric
Speaking Coherently

## 2023-08-26 NOTE — HISTORY OF PRESENT ILLNESS
Anesthesia Post Evaluation    Patient: Richard Hartmann    Procedure(s) Performed: Procedure(s) (LRB):  OPEN REDUCTION, DISLOCATION, GREAT TOE INTERPHALANGEAL JOINT (Left)    Final Anesthesia Type: general      Patient location during evaluation: PACU  Patient participation: Yes- Able to Participate  Level of consciousness: awake and alert and oriented  Post-procedure vital signs: reviewed and stable  Pain management: adequate  Airway patency: patent  ZAFAR mitigation strategies: Multimodal analgesia  PONV status at discharge: No PONV  Anesthetic complications: no      Cardiovascular status: hemodynamically stable and hypotensive (mild hypotension noted in PACU, fluid bolus of NS administered with satisfactory results)  Respiratory status: unassisted and spontaneous ventilation  Hydration status: euvolemic  Follow-up not needed.          Vitals Value Taken Time   BP 89/35 08/26/23 0856   Temp 36.4 °C (97.6 °F) 08/26/23 0850   Pulse 66 08/26/23 0900   Resp 10 08/26/23 0900   SpO2 95 % 08/26/23 0900   Vitals shown include unvalidated device data.      No case tracking events are documented in the log.      Pain/Tati Score: Pain Rating Prior to Med Admin: 10 (8/26/2023  3:40 AM)  Pain Rating Post Med Admin: 0 (8/26/2023  4:10 AM)  Tati Score: 4 (8/26/2023  8:43 AM)         [None] : no symptoms [FreeTextEntry1] : This is a 86 year old male with BPH, lower urinary tract symptoms\par s/p transurethral resection of prostates 7/12/2021\par Presents today for POD #3 quintanilla removal\par Patient reports quintanilla catheter draining well, dark blood tinge urine in leg bag. \par Denies pain. fever and chills.